# Patient Record
Sex: MALE | Race: WHITE | NOT HISPANIC OR LATINO | Employment: STUDENT | URBAN - METROPOLITAN AREA
[De-identification: names, ages, dates, MRNs, and addresses within clinical notes are randomized per-mention and may not be internally consistent; named-entity substitution may affect disease eponyms.]

---
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
## 2022-03-21 ENCOUNTER — OFFICE VISIT (OUTPATIENT)
Dept: URGENT CARE | Facility: MEDICAL CENTER | Age: 22
End: 2022-03-21
Payer: COMMERCIAL

## 2022-03-21 VITALS — WEIGHT: 180 LBS | BODY MASS INDEX: 26.66 KG/M2 | RESPIRATION RATE: 16 BRPM | TEMPERATURE: 98.1 F | HEIGHT: 69 IN

## 2022-03-21 DIAGNOSIS — L01.00 IMPETIGO: Primary | ICD-10-CM

## 2022-03-21 PROCEDURE — G0382 LEV 3 HOSP TYPE B ED VISIT: HCPCS | Performed by: FAMILY MEDICINE

## 2022-03-21 RX ORDER — CEPHALEXIN 500 MG/1
500 CAPSULE ORAL EVERY 12 HOURS SCHEDULED
Qty: 14 CAPSULE | Refills: 0 | Status: SHIPPED | OUTPATIENT
Start: 2022-03-21 | End: 2022-03-28

## 2022-03-21 NOTE — PATIENT INSTRUCTIONS
Patient is afebrile  I prescribed cephalexin 500 mg twice a day for 7 days  May continue to apply triamcinolone cream as needed for itching at least twice a day  I advised patient to keep rash occluded for now  If rash persists does not improve after 7 days, he was advised to consult with dermatologist   He was given outpatient dermatology referral     Impetigo   WHAT YOU NEED TO KNOW:   Impetigo is a skin infection caused by bacteria  The infection can cause sores to form anywhere on your body  The sores develop watery or pus-filled blisters that break and form thick crusts  Impetigo is most common in children and spreads easily from person to person  DISCHARGE INSTRUCTIONS:   Return to the emergency department if:   · You have painful, red, warm skin around the blisters  · Your face is swollen  · You urinate less than usual or there is blood in your urine  Contact your healthcare provider if:   · You have a fever  · The sores become more red, swollen, warm, or tender  · The sores do not start to heal after 3 days of treatment  · You have questions or concerns about your condition or care  Medicines:   · Antibiotics  treat the bacterial infection  Antibiotics may be given as a pill or cream  Wash your skin and gently remove any crusts before you apply the antibiotic cream      · Take your medicine as directed  Contact your healthcare provider if you think your medicine is not helping or if you have side effects  Tell him or her if you are allergic to any medicine  Keep a list of the medicines, vitamins, and herbs you take  Include the amounts, and when and why you take them  Bring the list or the pill bottles to follow-up visits  Carry your medicine list with you in case of an emergency  Prevent the spread of impetigo:   · Avoid direct contact  You can spread impetigo if someone touches or uses something that touched your infected skin   You can also spread impetigo on your own body when you touch the area and then touch somewhere else  Keep the sores covered with gauze so you will not scratch or touch them  Keep your fingernails short  Your child may need to wear mittens so he does not scratch his sores  · Wash your hands often  Always wash your hands after you touch the infected area  Wash your hands before you touch food, your eyes, or other people  If no water is available, use an alcohol-based gel to clean your hands  · Wash household items  Do not share or reuse items that have come in contact with impetigo sores  Examples include bedding, towels, washcloths, and eating utensils  These items may be used again after they have been washed with hot water and soap  Clean your sores safely:  Wash your skin sores with antibacterial soap and water  You may need to do this 2 to 3 times each day until the sores heal  If the area is crusted, gently wash the sores with gauze or a clean washcloth to remove the crust  Pat the area dry with a clean towel  Wash your hands, the washcloth, and the towel after you clean the area around the sores  Return to work or school: You may return to work or school 48 hours after you start the antibiotic medicine  If your child has impetigo, tell his school or  center about the infection  Follow up with your doctor as directed:  Write down your questions so you remember to ask them during your visits  © [employer] 2022 Information is for End User's use only and may not be sold, redistributed or otherwise used for commercial purposes  All illustrations and images included in CareNotes® are the copyrighted property of [employer]  or [employer]  The above information is an  only  It is not intended as medical advice for individual conditions or treatments  Talk to your doctor, nurse or pharmacist before following any medical regimen to see if it is safe and effective for you

## 2022-03-21 NOTE — PROGRESS NOTES
[id number]        NAME: Dannielle Mckeon is a 25 y o  male  : 2000    MRN: [id number]  DATE: 2022  TIME: 11:45 AM    Assessment and Plan   Impetigo [L01 00]  1  Impetigo  cephalexin (KEFLEX) 500 mg capsule    Ambulatory Referral to Dermatology         Patient Instructions       Follow up with PCP in 3-5 days  Proceed to  ER if symptoms worsen  Chief Complaint     Chief Complaint   Patient presents with    Rash     painful (burning), itchy rash to right lateral thigh 6 days  visited [facility] and prescribed Triamcinalone and Prednisone (not started for fear dose is too high)  History of Present Illness       19-year-old male here today with rash located in the right thigh for the past 6 days  He began is small red rash about size of a quarter and has since increased in size and redness  Describes that is somewhat tender to touch, mildly pruritic  He was seen at [facility] approximately 2 days ago where he was prescribed triamcinolone cream and prednisone tablets 60 mg which she did not fell  He has apply the triamcinolone once a twice a day with no significant improvement  Rash has since spread  Describes firm feeling in the right upper thigh  Upon further questioning, he describes being at the beach approximately week and a half ago but does not recall walking through high grass  He has a known history of aortic area which usually requires him to take Allegra as needed  Review of Systems   Review of Systems   Skin: Positive for rash           Current Medications       Current Outpatient Medications:     fexofenadine (ALLEGRA) 60 MG tablet, Take by mouth  , Disp: , Rfl:     multivitamin (THERAGRAN) TABS, Take 1 tablet by mouth daily, Disp: , Rfl:     albuterol (ProAir HFA) 90 mcg/act inhaler, Inhale 2 puffs every 6 (six) hours as needed for wheezing (Patient not taking: Reported on 2021 ), Disp: 8 5 g, Rfl: 0    benzonatate (TESSALON) 200 MG capsule, Take 1 capsule (200 mg total) by mouth 3 (three) times a day as needed for cough (Patient not taking: Reported on 3/21/2022 ), Disp: 20 capsule, Rfl: 0    cephalexin (KEFLEX) 500 mg capsule, Take 1 capsule (500 mg total) by mouth every 12 (twelve) hours for 7 days, Disp: 14 capsule, Rfl: 0    fluticasone (FLONASE) 50 mcg/act nasal spray, 2 sprays into each nostril daily (Patient not taking: Reported on 3/21/2022 ), Disp: 16 g, Rfl: 0    Current Allergies     Allergies as of 03/21/2022 - Reviewed 03/21/2022   Allergen Reaction Noted    Clindamycin Hives 05/26/2021    Amoxicillin GI Intolerance 04/26/2014    Doxycycline Hives 12/18/2021            The following portions of the patient's history were reviewed and updated as appropriate: allergies, current medications, past family history, past medical history, past social history, past surgical history and problem list      Past Medical History:   Diagnosis Date    Concussion     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        Past Surgical History:   Procedure Laterality Date    CIRCUMCISION      WISDOM TOOTH EXTRACTION         Family History   Problem Relation Age of Onset    Autoimmune disease Mother     Hypertension Father          Medications have been verified  Objective   Temp 98 1 °F (36 7 °C)   Resp 16   Ht 5' 9" (1 753 m)   Wt 81 6 kg (180 lb)   BMI 26 58 kg/m²   No LMP for male patient  Physical Exam     Physical Exam  Vitals and nursing note reviewed  Constitutional:       Appearance: Normal appearance  Skin:     Comments: Right thigh reveals erythematous, slightly elevated rash largest area measuring approximately 4 in in diameter  Firm to palpation  There is peripheral rash which seems erythematous vesicular lesion some streaking  Findings are consistent with impetigo   Neurological:      Mental Status: He is alert

## 2022-03-24 ENCOUNTER — AMB VIDEO VISIT (OUTPATIENT)
Dept: OTHER | Facility: HOSPITAL | Age: 22
End: 2022-03-24

## 2022-03-24 DIAGNOSIS — L03.115 CELLULITIS OF RIGHT LOWER EXTREMITY: Primary | ICD-10-CM

## 2022-03-24 PROCEDURE — ECARE PR SL URGENT CARE VIRTUAL VISIT: Performed by: NURSE PRACTITIONER

## 2022-03-24 RX ORDER — SULFAMETHOXAZOLE AND TRIMETHOPRIM 800; 160 MG/1; MG/1
1 TABLET ORAL EVERY 12 HOURS SCHEDULED
Qty: 14 TABLET | Refills: 0 | Status: SHIPPED | OUTPATIENT
Start: 2022-03-24 | End: 2022-03-31

## 2022-03-24 NOTE — PROGRESS NOTES
Video Visit - Suzanna Cordova 25 y o  male MRN: [id number]    REQUIRED DOCUMENTATION:         1  [facility]  2  Provider located at [address] [place] [phone]  3  [facility] provider: PEPPER Ramírez  4  Identify all parties in room with patient during [facility] visit:  Patient   5  After connecting through [employer], patient was identified by name and date of birth  Patient was then informed that this was a Telemedicine visit and that the exam was being conducted confidentially over secure lines  My office door was closed  No one else was in the room  Patient acknowledged consent and understanding of privacy and security of the Telemedicine visit  I informed the patient that I have reviewed their record in Epic and presented the opportunity for them to ask any questions regarding the visit today  The patient agreed to participate  This is a 25year old male here today for video visit  He states that on 03/18/2022 he was started on steriod on [facility] for a dermatitis  He did not start this but started steroid cream that did help  He states over the next 3 days he had no improvement  Area did start out as itchy but now is only slightly itichy  He states since starting Keflex he has not had much improvement  He denies any fevers, body aches or chills  She was diagnosed with impetigo on on 03/21  He states the area has not grown in size but has become more red  He states it started as a small [place] and then spread  He states the area is slightly warm to touch  He does note he was a at beach prior to onset of symptoms but does not recall any exposures  Review of Systems   Constitutional: Negative for activity change, chills, fatigue and unexpected weight change  Respiratory: Negative  Cardiovascular: Negative  Skin: Positive for rash and wound  Neurological: Negative  Psychiatric/Behavioral: Negative  Physical Exam  HENT:      Head: Normocephalic and atraumatic  Eyes:      Conjunctiva/sclera: Conjunctivae normal    Pulmonary:      Effort: Pulmonary effort is normal  No respiratory distress  Comments: No cough  Skin:     Comments: Right outter thigh: are of erythema that appears to be indurated  No vesicular lesions  There are 2 area that are linear and raised which would be more consistent with a dermatitis but the rest is circular  This is not streaking in nature  Area is warm as per patient  Rash is blanchable when patient applies pressure to rash  Neurological:      General: No focal deficit present  Mental Status: He is oriented to person, place, and time  Psychiatric:         Mood and Affect: Mood normal          Behavior: Behavior normal          Thought Content: Thought content normal          Judgment: Judgment normal        Diagnoses and all orders for this visit:    Cellulitis of right lower extremity  -     sulfamethoxazole-trimethoprim (BACTRIM DS) 800-160 mg per tablet; Take 1 tablet by mouth every 12 (twelve) hours for 7 days  -     mupirocin (BACTROBAN) 2 % ointment; Apply topically 3 (three) times a day      Patient Instructions   Patient hesitant to try oral steroid  Will start bactrim and continue Kelfex to see if there is improvement  Will start Bactroban cream   Patient was given strict instructions to monitor and should follow up in 2-3 days if no improvement  Go to ER with any worsening symptoms, increased redness, fevers streaking or swelling  Follow up with PCP if not improved, if symptoms are worse, go to the ER

## 2022-03-25 ENCOUNTER — HOSPITAL ENCOUNTER (EMERGENCY)
Facility: HOSPITAL | Age: 22
Discharge: HOME | End: 2022-03-25
Attending: EMERGENCY MEDICINE | Admitting: EMERGENCY MEDICINE
Payer: COMMERCIAL

## 2022-03-25 VITALS
SYSTOLIC BLOOD PRESSURE: 128 MMHG | TEMPERATURE: 99.7 F | OXYGEN SATURATION: 99 % | HEART RATE: 111 BPM | DIASTOLIC BLOOD PRESSURE: 65 MMHG | RESPIRATION RATE: 20 BRPM

## 2022-03-25 DIAGNOSIS — R21 RASH: Primary | ICD-10-CM

## 2022-03-25 PROCEDURE — 99283 EMERGENCY DEPT VISIT LOW MDM: CPT

## 2022-03-25 PROCEDURE — 99284 EMERGENCY DEPT VISIT MOD MDM: CPT | Performed by: EMERGENCY MEDICINE

## 2022-03-25 RX ORDER — PREDNISONE 20 MG/1
40 TABLET ORAL DAILY
Qty: 8 TABLET | Refills: 0 | Status: SHIPPED | OUTPATIENT
Start: 2022-03-26 | End: 2022-03-30

## 2022-03-25 RX ORDER — PREDNISONE 20 MG/1
40 TABLET ORAL DAILY
Qty: 8 TABLET | Refills: 0 | Status: SHIPPED | OUTPATIENT
Start: 2022-03-26 | End: 2022-03-25 | Stop reason: CLARIF

## 2022-03-25 RX ORDER — DIPHENHYDRAMINE HCL 25 MG
50 TABLET ORAL ONCE
Status: COMPLETED | OUTPATIENT
Start: 2022-03-25 | End: 2022-03-25

## 2022-03-25 RX ORDER — PREDNISONE 20 MG/1
40 TABLET ORAL ONCE
Status: COMPLETED | OUTPATIENT
Start: 2022-03-25 | End: 2022-03-25

## 2022-03-25 RX ADMIN — DIPHENHYDRAMINE HCL 50 MG: 25 TABLET ORAL at 15:16

## 2022-03-25 RX ADMIN — PREDNISONE 40 MG: 20 TABLET ORAL at 15:17

## 2022-03-25 NOTE — ED ATTENDING ATTESTATION
3/25/2022  ILisa MD, saw and evaluated the patient  I have discussed the patient with the resident/non-physician practitioner and agree with the resident's/non-physician practitioner's findings, Plan of Care, and MDM as documented in the resident's/non-physician practitioner's note, except where noted  All available labs and Radiology studies were reviewed  I was present for key portions of any procedure(s) performed by the resident/non-physician practitioner and I was immediately available to provide assistance  At this point I agree with the current assessment done in the Emergency Department    I have conducted an independent evaluation of this patient a history and physical is as follows:  Patient presents for evaluation of a rash approximately 8 or 9 days ago he was at the beach he was not in the sun he thought his though he may have been bitten by something on his right lateral thigh thought there was a small bump and he developed a rash initially given steroid cream then seen once again started on Keflex then had a video visit yesterday and was started on Bactrim since he started taking the Bactrim he has developed redness of his legs below the knees and in his left thigh the 2 rashes are not similar  Mildly pruritic   No respiratory symptoms no mucous membrane involvement no swelling of the hands or the joints no abdominal pain  Exam the patient is in no acute distress throat is clear mucous membranes are moist there is no lesions in the mouth lungs are clear heart is regular abdomen soft nontender the patient has appears to be a drug rash on his lower extremities it blanches no petechiae are noted there is also compliant patch of raised red rash that blanches on his right lateral thigh it is not  warm or indurated  From a clinical standpoint this appears to be an allergic dermatitis on his right thigh however his other rash appears to be related to the Bactrim  I have advised the patient to stop all antibiotics I advised the patient not to use Neosporin on the area  I will place the patient on a short course of steroids and Benadryl  ED Course         Critical Care Time  Procedures

## 2022-03-25 NOTE — DISCHARGE INSTRUCTIONS
Follow-up with PCP for further care  You can also follow up with dermatology if needed  Contact info provided below if needed  Stop taking prescribed antibiotics  Use Benadryl as stated on the bottle  Take prescribed medications as prescribed and to completion  Return to the ED with new or worsening symptoms

## 2022-03-25 NOTE — PATIENT INSTRUCTIONS
Patient hesitant to try oral steroid  Will start bactrim and continue Kelfex to see if there is improvement  Will start Bactroban cream   Patient was given strict instructions to monitor and should follow up in 2-3 days if no improvement  Go to ER with any worsening symptoms, increased redness, fevers streaking or swelling

## 2022-03-25 NOTE — ED PROVIDER NOTES
History  Chief Complaint   Patient presents with    Allergic Reaction     started Sulfa-Methoxazole  PM and  AM; noticed bright red rash on legs and now throughout body; no SOB, N/V/D; yes headache    Rash     Pt is a 19yo M who presents for rash  Patient reports that approximately week ago he noticed a red area on the lateral aspect of his right thigh  Patient reports that it was multiple reddened areas that progressed and formed a confluence  Patient states he was started on Keflex and did not appear to be having any improvement  Patient states he was seen again yesterday and started on Bactrim in addition to his Keflex  Patient states when he woke up this morning someone made a comment about the redness of his legs  Patient states it was at this time that he noticed a diffuse erythematous rash on extremities and trunk  Patient states the initial area of rash is minimally painful and nonpruritic  Patient denies any pruritus or pain in the diffuse rash  Patient states that he has previously had allergic reactions to other antibiotics  Patient denies any difficulty breathing or swallowing  Patient able to swallow secretions and denies any intraoral swelling  Patient denies any changes to soaps, laundry detergent, or cosmetic products  Patient states he is otherwise healthy  Prior to Admission Medications   Prescriptions Last Dose Informant Patient Reported? Taking?    albuterol (ProAir HFA) 90 mcg/act inhaler   No No   Sig: Inhale 2 puffs every 6 (six) hours as needed for wheezing   Patient not taking: Reported on 2021    cephalexin (KEFLEX) 500 mg capsule   No No   Sig: Take 1 capsule (500 mg total) by mouth every 12 (twelve) hours for 7 days   fexofenadine (ALLEGRA) 60 MG tablet  Self Yes No   Sig: Take by mouth     fluticasone (FLONASE) 50 mcg/act nasal spray   No No   Si sprays into each nostril daily   Patient not taking: Reported on 3/21/2022    multivitamin [facility]) TABS  Self Yes No   Sig: Take 1 tablet by mouth daily   mupirocin (BACTROBAN) 2 % ointment   No No   Sig: Apply topically 3 (three) times a day   sulfamethoxazole-trimethoprim (BACTRIM DS) 800-160 mg per tablet   No No   Sig: Take 1 tablet by mouth every 12 (twelve) hours for 7 days      Facility-Administered Medications: None       Past Medical History:   Diagnosis Date    Concussion     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        Past Surgical History:   Procedure Laterality Date    CIRCUMCISION      WISDOM TOOTH EXTRACTION         Family History   Problem Relation Age of Onset    Autoimmune disease Mother     Hypertension Father      I have reviewed and agree with the history as documented  E-Cigarette/Vaping    E-Cigarette Use Never User      E-Cigarette/Vaping Substances    Nicotine No     THC No     CBD No     Flavoring No     Other No     Unknown No      Social History     Tobacco Use    Smoking status: Never Smoker    Smokeless tobacco: Never Used   Vaping Use    Vaping Use: Never used   Substance Use Topics    Alcohol use: Yes    Drug use: No        Review of Systems   Skin: Positive for rash  Neurological: Positive for headaches  All other systems reviewed and are negative  Physical Exam  ED Triage Vitals [03/25/22 1418]   Temperature Pulse Respirations Blood Pressure SpO2   99 7 °F (37 6 °C) (!) 111 20 128/65 99 %      Temp Source Heart Rate Source Patient Position - Orthostatic VS BP Location FiO2 (%)   Oral Monitor -- -- --      Pain Score       --             Orthostatic Vital Signs  Vitals:    03/25/22 1418   BP: 128/65   Pulse: (!) 111       Physical Exam  Vitals reviewed  Constitutional:       General: He is not in acute distress  Appearance: He is well-developed  He is not toxic-appearing or diaphoretic  HENT:      Head: Normocephalic and atraumatic        Right Ear: External ear normal       Left Ear: External ear normal       Nose: Nose normal       Mouth/Throat:      Mouth: Mucous membranes are moist       Pharynx: Oropharynx is clear  Comments: No intraoral lesions or swelling noted  Eyes:      Extraocular Movements: Extraocular movements intact  Conjunctiva/sclera: Conjunctivae normal       Pupils: Pupils are equal, round, and reactive to light  Cardiovascular:      Rate and Rhythm: Regular rhythm  Tachycardia present  Heart sounds: Normal heart sounds  No murmur heard  Pulmonary:      Effort: Pulmonary effort is normal  No respiratory distress  Breath sounds: Normal breath sounds  No stridor  No wheezing  Abdominal:      General: There is no distension  Palpations: Abdomen is soft  Tenderness: There is no abdominal tenderness  Musculoskeletal:         General: Normal range of motion  Cervical back: Normal range of motion and neck supple  Right lower leg: No edema  Left lower leg: No edema  Skin:     General: Skin is warm and dry  Capillary Refill: Capillary refill takes less than 2 seconds  Comments: Slightly raised and irregular erythematous rash on lateral aspect of right thigh; blanching; nonpainful to palpation  Diffuse, flat erythema present on extremities and trunk; blanching; nonpainful to palpation  All rashes Nikolsky negative   Neurological:      General: No focal deficit present  Mental Status: He is alert and oriented to person, place, and time  Psychiatric:         Speech: Speech normal          Behavior: Behavior is cooperative  ED Medications  Medications   diphenhydrAMINE (BENADRYL) tablet 50 mg (50 mg Oral Given 3/25/22 1516)   predniSONE tablet 40 mg (40 mg Oral Given 3/25/22 1517)       Diagnostic Studies  Results Reviewed     None                 No orders to display         Procedures  Procedures      ED Course         CRAFFT      Most Recent Value   SBIRT (13-21 yo)    In order to provide better care to our patients, we are screening all of our patients for alcohol and drug use   Would it be okay to ask you these screening questions? Yes Filed at: 03/25/2022 1443   RACHAEL Initial Screen: During the past 12 months, did you:    1  Drink any alcohol (more than a few sips)? No Filed at: 03/25/2022 1443   2  Smoke any marijuana or hashish No Filed at: 03/25/2022 1443   3  Use anything else to get high? ("anything else" includes illegal drugs, over the counter and prescription drugs, and things that you sniff or 'gregg')? No Filed at: 03/25/2022 1443                                    MDM  Number of Diagnoses or Management Options  Rash  Diagnosis management comments: Pt is a 19yo M who presents with rash  Exam pertinent for distinct rash on right lateral thigh as well as diffuse erythematous rash  Differential diagnosis to include but not limited to medication reaction, cellulitis, SJS, urticaria  Will treat as allergic reaction with Benadryl and prednisone  Patient's initial rash does not appear to be cellulitic and therefore will recommend patient stop antibiotics  No evidence of skin sloughing and therefore unlikely to be yesterday SJS  Discussed plan with patient who is in agreement  Will also give patient contact information for Dermatology for follow-up  Plan to discharge pt with f/u to dermatology  Discussed returning the ED with significant worsening of symptoms or skin sloughing  Discussed use of over the counter medications as stated on the bottle as needed for pain  Discussed taking new medication as prescribed and to completion  Discussed discontinuing all antibiotics previously prescribed  Pt expressed understanding of discharge instructions, return precautions, and medication instructions  All questions were answered and pt was discharged without incident             Disposition  Final diagnoses:   Rash     Time reflects when diagnosis was documented in both MDM as applicable and the Disposition within this note     Time User Action Codes Description Comment    3/25/2022  3:28 PM Sherry Daey Add [R21] Rash       ED Disposition     ED Disposition Condition Date/Time Comment    Discharge Stable Fri Mar 25, 2022  3:28 PM Randy Bill discharge to home/self care  Follow-up Information     Follow up With Specialties Details Why Contact Info Additional [address], DO Family Medicine Call  As needed [address] [facility] [phone] [facility]  [phone]       ADRIANE CLEMENT Dermatology Call  As needed [address] [id number] [phone] [facility] GRACE MANCUSO, [facility] [place] [address] [place] [address], [address] [place] [address]          Discharge Medication List as of 3/25/2022  3:39 PM      START taking these medications    Details   predniSONE 20 mg tablet Take 2 tablets (40 mg total) by mouth daily for 4 days, Starting Sat 3/26/2022, Until Wed 3/30/2022, Normal         CONTINUE these medications which have NOT CHANGED    Details   albuterol (ProAir HFA) 90 mcg/act inhaler Inhale 2 puffs every 6 (six) hours as needed for wheezing, Starting Mon 10/11/2021, Normal      cephalexin (KEFLEX) 500 mg capsule Take 1 capsule (500 mg total) by mouth every 12 (twelve) hours for 7 days, Starting Mon 3/21/2022, Until Mon 3/28/2022, Normal      fexofenadine (ALLEGRA) 60 MG tablet Take by mouth  , Historical Med      fluticasone (FLONASE) 50 mcg/act nasal spray 2 sprays into each nostril daily, Starting Sat 12/18/2021, Normal      multivitamin (THERAGRAN) TABS Take 1 tablet by mouth daily, Historical Med      mupirocin (BACTROBAN) 2 % ointment Apply topically 3 (three) times a day, Starting Thu 3/24/2022, Normal      sulfamethoxazole-trimethoprim (BACTRIM DS) 800-160 mg per tablet Take 1 tablet by mouth every 12 (twelve) hours for 7 days, Starting Thu 3/24/2022, Until Thu 3/31/2022, Normal           No discharge procedures on file      PDMP Review     None           ED Provider  Attending physically available and evaluated Ezekiel Sebastian I managed the patient along with the ED Attending      Electronically Signed by         Johnson Patel MD  04/01/22 [id number]

## 2022-03-25 NOTE — CARE ANYWHERE EVISITS
Visit Summary for Christy Dietrich - Gender: Male - Date of Birth: [birth year]  Date: [id number] - Duration: 12 minutes  Patient: Christy Dietrich  Provider: Joseph PABON    Patient Contact Information  Address  [address] [place] [address]; [address] [facility]  [phone]    Visit Topics  Rash [Added By: Self - 2022-03-24]    Triage Questions   What is your current physical address in the event of a medical emergency? Answer []  Are you allergic to any medications? Answer []  Are you now or could you be pregnant? Answer []  Do you have any immune system compromise or chronic lung   disease? Answer []  Do you have any vulnerable family members in the home (infant, pregnant, cancer, elderly)? Answer []     Conversation Transcripts  [0A][0A] [Notification] You are connected with Yovanny Lim, Urgent Care [facility] is located in [place]  [0A][Notification] Christy Dietrich has shared health history  Lou Rosesoha  [0A]    Diagnosis  Cellulitis of right lower limb    Procedures  Value: [id number] Code: CPT-4 UNLISTED E&M SERVICE    Medications Prescribed    No prescriptions ordered    Electronically signed by: Yovanny Whitley(NPI [id number])

## 2022-03-31 ENCOUNTER — TELEPHONE (OUTPATIENT)
Dept: DERMATOLOGY | Facility: CLINIC | Age: 22
End: 2022-03-31

## 2023-01-26 ENCOUNTER — TELEPHONE (OUTPATIENT)
Dept: FAMILY MEDICINE CLINIC | Facility: CLINIC | Age: 23
End: 2023-01-26

## 2023-01-26 NOTE — TELEPHONE ENCOUNTER
1st call    [facility] for patient to call back to schedule annual physical   Last OV 7/21/21  Requested call back to let us know if he has established care elsewhere

## 2023-06-28 ENCOUNTER — TELEPHONE (OUTPATIENT)
Dept: UROLOGY | Facility: AMBULATORY SURGERY CENTER | Age: 23
End: 2023-06-28

## 2023-06-28 NOTE — TELEPHONE ENCOUNTER
New Patient    What is the reason for the patient’s appointment?: Pt states that he has had a dull have numb pain in the left side of his testicle for about about a week  He states he doesn't think is it is swollen  No problems urinating       What office location does the patient prefer?: medical Center     Does patient have Imaging/Lab Results: no    Have patient records been requested?: no   If No, are the records showing in Epic: yes       INSURANCE:   Do we accept the patient's insurance or is the patient Self-Pay?: yes    Insurance Provider: Frosty Dew  Plan Type/Number:   Member ID#:       HISTORY:   Has the patient had any previous Urologist(s)?: no    Was the patient seen in the ED?: no    Has the patient had any outside testing done?: no    Does the patient have a personal history of cancer?: no     Pt can be reached [phone]

## 2023-06-29 NOTE — TELEPHONE ENCOUNTER
Patient returning missed call from triage nurse Everett Thakur      Transferred call to Everett Thakur

## 2023-06-29 NOTE — TELEPHONE ENCOUNTER
Spoke with patient, reports having testicle pain and pain at base of his penis for a few weeks now  No other symptoms  Denies fever, chills, nausea or vomiting  Denies any trouble with urination  Appt scheduled for 07/14/23  ER precautions reviewed

## 2023-07-14 ENCOUNTER — OFFICE VISIT (OUTPATIENT)
Dept: UROLOGY | Facility: CLINIC | Age: 23
End: 2023-07-14
Payer: COMMERCIAL

## 2023-07-14 VITALS
WEIGHT: 144 LBS | BODY MASS INDEX: 21.82 KG/M2 | SYSTOLIC BLOOD PRESSURE: 120 MMHG | DIASTOLIC BLOOD PRESSURE: 80 MMHG | HEIGHT: 68 IN | OXYGEN SATURATION: 98 % | HEART RATE: 88 BPM

## 2023-07-14 DIAGNOSIS — N50.812 TESTICULAR PAIN, LEFT: Primary | ICD-10-CM

## 2023-07-14 PROCEDURE — 99203 OFFICE O/P NEW LOW 30 MIN: CPT | Performed by: PHYSICIAN ASSISTANT

## 2023-07-14 NOTE — PROGRESS NOTES
7/14/2023      Chief Complaint   Patient presents with   • New Patient Visit     Patient in our office due to Left side testicular pain/ noticed pain 2 weeks ago         Assessment and Plan    23 y.o. male managed by NEW PATIENT    1. Left orchalgia  2. Remote penile sexual injury      His orchalgia is improving spontaneously. I suggested nsaid prn discomfort and stretching before/after exercise, good scrotal support. Check testicular US evaluate his anatomy and cords. Suspicion for mass or torsion is exceedingly low. For his remote penile sexual injury with a bend during intercourse he has good rigid erections without curvature. Some mild pain which has improved over the year. Perhaps an incomplete corporal injury with acute pain phase resolving. History of Present Illness  Annabel Antony is a 23 y.o. male here for evaluation of dull pain left testicle 2 weeks or so some pain at base of penis with erections for the past year after a minor sexual injury with female partner on top penis bent no pain no pop no swelling or bruising. Has had normal erections since but some discomfort in that one spot. No voiding issue. No nausea/vomiting. No hematuria or dysuria. Nothing makes it better or worse. No prior  surgeries. Review of Systems   Constitutional: Negative. Respiratory: Negative. Cardiovascular: Negative. Genitourinary: Negative for decreased urine volume, difficulty urinating, dysuria, flank pain, frequency, hematuria and urgency. Musculoskeletal: Negative.             AUA SYMPTOM SCORE    Flowsheet Row Most Recent Value   AUA SYMPTOM SCORE    How often have you had a sensation of not emptying your bladder completely after you finished urinating? 2 (P)     How often have you had to urinate again less than two hours after you finished urinating? 4 (P)     How often have you found you stopped and started again several times when you urinate? 1 (P)     How often have you found it difficult to postpone urination? 2 (P)     How often have you had a weak urinary stream? 0 (P)     How often have you had to push or strain to begin urination? 3 (P)     How many times did you most typically get up to urinate from the time you went to bed at night until the time you got up in the morning? 1 (P)     Quality of Life: If you were to spend the rest of your life with your urinary condition just the way it is now, how would you feel about that? 2 (P)     AUA SYMPTOM SCORE 13 (P)            Vitals  Vitals:    07/14/23 1040   BP: 120/80   BP Location: Left arm   Patient Position: Sitting   Cuff Size: Standard   Pulse: 88   SpO2: 98%   Weight: 65.3 kg (144 lb)   Height: 5' 8" (1.727 m)       Physical Exam  Vitals and nursing note reviewed. Constitutional:       General: He is not in acute distress. Appearance: He is well-developed. He is not diaphoretic. HENT:      Head: Normocephalic and atraumatic. Pulmonary:      Effort: Pulmonary effort is normal.   Genitourinary:     Comments: Circumcised penis, normal phallus, orthotopic patent meatus. No penile plaque or curvature or tenderness. Testes smooth descended bilaterally into the scrotum nontender with no palpable mass. Skin:     General: Skin is warm and dry. Neurological:      Mental Status: He is alert and oriented to person, place, and time. Gait: Gait normal.   Psychiatric:         Speech: Speech normal.         Behavior: Behavior normal.           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      Social History     Socioeconomic History   • Marital status: Single     Spouse name: Not on file   • Number of children: Not on file   • Years of education: Not on file   • Highest education level: Not on file   Occupational History   • Not on file   Tobacco Use   • Smoking status: Never   • Smokeless tobacco: Never   Vaping Use   • Vaping Use: Never used   Substance and Sexual Activity   • Alcohol use:  Yes • Drug use: No   • Sexual activity: Not Currently   Other Topics Concern   • Not on file   Social History Narrative   • Not on file     Social Determinants of Health     Financial Resource Strain: Not on file   Food Insecurity: Not on file   Transportation Needs: Not on file   Physical Activity: Not on file   Stress: Not on file   Social Connections: Not on file   Intimate Partner Violence: Not on file   Housing Stability: Not on file     Social History     Tobacco Use   Smoking Status Never   Smokeless Tobacco Never     Family History   Problem Relation Age of Onset   • Autoimmune disease Mother    • Hypertension Father        The following portions of the patient's history were reviewed and updated as appropriate: allergies, current medications, past medical history, past social history, past surgical history and problem list.    Results  No results found for this or any previous visit (from the past 1 hour(s)). ]  No results found for: "PSA"  Lab Results   Component Value Date    CALCIUM 10.0 07/21/2021    K 4.5 07/21/2021    CO2 31 07/21/2021     07/21/2021    BUN 12 07/21/2021    CREATININE 0.82 07/21/2021     Lab Results   Component Value Date    WBC 5.44 07/21/2021    HGB 15.4 07/21/2021    HCT 43.5 07/21/2021    MCV 89 07/21/2021     07/21/2021

## 2023-07-21 ENCOUNTER — OFFICE VISIT (OUTPATIENT)
Dept: FAMILY MEDICINE CLINIC | Facility: CLINIC | Age: 23
End: 2023-07-21
Payer: COMMERCIAL

## 2023-07-21 VITALS
RESPIRATION RATE: 16 BRPM | OXYGEN SATURATION: 98 % | DIASTOLIC BLOOD PRESSURE: 70 MMHG | BODY MASS INDEX: 28.06 KG/M2 | HEART RATE: 65 BPM | TEMPERATURE: 97.6 F | SYSTOLIC BLOOD PRESSURE: 126 MMHG | WEIGHT: 196 LBS | HEIGHT: 70 IN

## 2023-07-21 DIAGNOSIS — R00.2 PALPITATIONS: ICD-10-CM

## 2023-07-21 DIAGNOSIS — R35.0 URINARY FREQUENCY: ICD-10-CM

## 2023-07-21 DIAGNOSIS — R79.89 ELEVATED LIVER FUNCTION TESTS: Primary | ICD-10-CM

## 2023-07-21 DIAGNOSIS — R10.10 PAIN OF UPPER ABDOMEN: ICD-10-CM

## 2023-07-21 LAB
SL AMB  POCT GLUCOSE, UA: NORMAL
SL AMB LEUKOCYTE ESTERASE,UA: NORMAL
SL AMB POCT BILIRUBIN,UA: NORMAL
SL AMB POCT BLOOD,UA: NORMAL
SL AMB POCT CLARITY,UA: CLEAR
SL AMB POCT COLOR,UA: YELLOW
SL AMB POCT KETONES,UA: NORMAL
SL AMB POCT NITRITE,UA: NORMAL
SL AMB POCT PH,UA: 6.5
SL AMB POCT SPECIFIC GRAVITY,UA: 1.01
SL AMB POCT URINE PROTEIN: NORMAL
SL AMB POCT UROBILINOGEN: 0.2

## 2023-07-21 PROCEDURE — 99214 OFFICE O/P EST MOD 30 MIN: CPT | Performed by: FAMILY MEDICINE

## 2023-07-21 PROCEDURE — 81003 URINALYSIS AUTO W/O SCOPE: CPT | Performed by: FAMILY MEDICINE

## 2023-07-21 NOTE — PROGRESS NOTES
Assessment/Plan:     1. Elevated liver function tests  Assessment & Plan:  Check labs and f/u with results; repeat u/s     Orders:  -     Comprehensive metabolic panel; Future  -     Lipid Panel with Direct LDL reflex; Future    2. Pain of upper abdomen  Assessment & Plan:  Check labs and repeat u/s; f/u with results; f/u guidance given    Orders:  -     US abdomen complete; Future; Expected date: 07/21/2023  -     Comprehensive metabolic panel; Future  -     Lipase; Future    3. Palpitations  Assessment & Plan:  EKG WNL; check labs and Holter; f/u guidance given    Orders:  -     CBC and differential; Future  -     TSH, 3rd generation; Future  -     POCT ECG  -     Holter monitor; Future; Expected date: 07/21/2023    4. Urinary frequency  Assessment & Plan:  U/a unremarkable; f/u with urology as recommended    Orders:  -     POCT urine dip auto non-scope        Subjective:      Patient ID: Cristopher Tenorio is a 23 y.o. male. Patient is here with concerns of chest tightness and palpitations. Seems to have started on Monday. Feels it intermittently. Notices it more when he is trying to fall asleep or sitting. He has been exercising regularly this week and has not had any issues. Denies any stress. Denies any supplements. Does drink coffee about 2 cups a day. No lightheadedness or dizziness. NO recent illnesses. Denies any known heart disease in family. Patient states his pain never fully resolved since when he had mono. Not daily. Seems to be mostly under ribs B/L. Did have elevated liver enzymes with mono and labs had returned to normal but symptoms had persisted. Patient went to urologist last week for testicular pain.        The following portions of the patient's history were reviewed and updated as appropriate: allergies, current medications, past family history, past medical history, past social history, past surgical history, and problem list.    Review of Systems   Constitutional: Negative for chills, fatigue and fever. Respiratory: Negative for shortness of breath and wheezing. Cardiovascular: Positive for chest pain and palpitations. Gastrointestinal: Positive for abdominal pain. Negative for blood in stool, constipation, diarrhea, nausea and vomiting. Neurological: Negative for dizziness and light-headedness. Objective:      /70 (BP Location: Right arm, Patient Position: Sitting, Cuff Size: Large)   Pulse 65   Temp 97.6 °F (36.4 °C) (Temporal)   Resp 16   Ht 5' 9.75" (1.772 m)   Wt 88.9 kg (196 lb)   SpO2 98%   BMI 28.33 kg/m²          Physical Exam  Vitals reviewed. Constitutional:       General: He is not in acute distress. Appearance: Normal appearance. He is not ill-appearing, toxic-appearing or diaphoretic. HENT:      Head: Normocephalic and atraumatic. Eyes:      General: No scleral icterus. Right eye: No discharge. Left eye: No discharge. Conjunctiva/sclera: Conjunctivae normal.   Cardiovascular:      Rate and Rhythm: Normal rate and regular rhythm. Pulses: Normal pulses. Heart sounds: Normal heart sounds. No murmur heard. No gallop. Pulmonary:      Effort: Pulmonary effort is normal. No respiratory distress. Breath sounds: Normal breath sounds. No stridor. No wheezing, rhonchi or rales. Abdominal:      Palpations: Abdomen is soft. There is no mass. Tenderness: There is no guarding or rebound. Musculoskeletal:      Right lower leg: No edema. Left lower leg: No edema. Neurological:      General: No focal deficit present. Mental Status: He is alert and oriented to person, place, and time. Psychiatric:         Mood and Affect: Mood normal.         Behavior: Behavior normal.         Thought Content:  Thought content normal.         Judgment: Judgment normal.

## 2023-07-22 ENCOUNTER — HOSPITAL ENCOUNTER (OUTPATIENT)
Dept: ULTRASOUND IMAGING | Facility: HOSPITAL | Age: 23
Discharge: HOME | End: 2023-07-22
Payer: COMMERCIAL

## 2023-07-22 DIAGNOSIS — N50.812 TESTICULAR PAIN, LEFT: ICD-10-CM

## 2023-07-22 PROCEDURE — 76870 US EXAM SCROTUM: CPT

## 2023-07-23 PROBLEM — R35.0 URINARY FREQUENCY: Status: ACTIVE | Noted: 2023-07-23

## 2023-07-23 PROBLEM — R00.2 PALPITATIONS: Status: ACTIVE | Noted: 2023-07-23

## 2023-07-23 PROCEDURE — 93000 ELECTROCARDIOGRAM COMPLETE: CPT | Performed by: FAMILY MEDICINE

## 2023-08-07 ENCOUNTER — HOSPITAL ENCOUNTER (OUTPATIENT)
Dept: ULTRASOUND IMAGING | Facility: HOSPITAL | Age: 23
Discharge: HOME | End: 2023-08-07
Payer: COMMERCIAL

## 2023-08-07 ENCOUNTER — HOSPITAL ENCOUNTER (OUTPATIENT)
Dept: NON INVASIVE DIAGNOSTICS | Facility: HOSPITAL | Age: 23
Discharge: HOME | End: 2023-08-07
Payer: COMMERCIAL

## 2023-08-07 DIAGNOSIS — R00.2 PALPITATIONS: ICD-10-CM

## 2023-08-07 DIAGNOSIS — R10.10 PAIN OF UPPER ABDOMEN: ICD-10-CM

## 2023-08-07 PROCEDURE — 93226 XTRNL ECG REC<48 HR SCAN A/R: CPT

## 2023-08-07 PROCEDURE — 93225 XTRNL ECG REC<48 HRS REC: CPT

## 2023-08-07 PROCEDURE — 76700 US EXAM ABDOM COMPLETE: CPT

## 2023-08-08 ENCOUNTER — OFFICE VISIT (OUTPATIENT)
Dept: URGENT CARE | Facility: MEDICAL CENTER | Age: 23
End: 2023-08-08
Payer: COMMERCIAL

## 2023-08-08 VITALS
HEART RATE: 104 BPM | DIASTOLIC BLOOD PRESSURE: 60 MMHG | SYSTOLIC BLOOD PRESSURE: 136 MMHG | RESPIRATION RATE: 20 BRPM | BODY MASS INDEX: 29.03 KG/M2 | TEMPERATURE: 102.5 F | WEIGHT: 196 LBS | HEIGHT: 69 IN | OXYGEN SATURATION: 98 %

## 2023-08-08 DIAGNOSIS — J02.9 SORE THROAT: Primary | ICD-10-CM

## 2023-08-08 LAB — S PYO AG THROAT QL: NEGATIVE

## 2023-08-08 PROCEDURE — 87070 CULTURE OTHR SPECIMN AEROBIC: CPT

## 2023-08-08 PROCEDURE — 99212 OFFICE O/P EST SF 10 MIN: CPT

## 2023-08-08 PROCEDURE — 87147 CULTURE TYPE IMMUNOLOGIC: CPT

## 2023-08-08 NOTE — PROGRESS NOTES
[facility]        NAME: Nahomy Bhat is a 26 y.o. male  : 2000    MRN: [id number]  DATE: 2023  TIME: 8:32 PM    Assessment and Plan   Sore throat [J02.9]  1. Sore throat  POCT rapid strepA    Throat culture        Rapid strep test: Negative. Throat culture sent out. Minimal concerns for peritonsillar abscess. Advised symptomatic treatment and strict ER precautions. Patient Instructions     Your rapid strep test came back negative. A throat culture will be sent out, the results will be available in about 24-72 hours on [facility]. If the culture comes back positive, you may need treatment with antibiotics. For now, treat symptomatically as below. Fever/Body Aches: We recommend you take 600mg ibuprofen every 6 hours or tylenol 650mg every 6 hours as needed for fever. If needed, you can alternate these medications so that you take one medication every 3 hours. For instance, at noon take ibuprofen, then at 3pm take tylenol, then at 6pm take ibuprofen. Cough: Delsym, an over the counter cough medication may be used every 12 hours as needed. Mucinex XR (guafenisen) 600 mg tablets may be used to thin out the mucous to make it easier to cough up. You may take 1-2 tablets twice per day as needed. Utilizing a vaporizer/humidifier may help, as well as increasing fluids. Sore Throat: Salt water gargles with 1 teaspoon of salt dissolved in 6-8 oz of water as needed can help with a sore throat, as can honey, drinking plenty of liquids, eating soft foods. If severe, can utilize OTC chloraseptic spray. Nasal Congestion: Over the counter allergy medication like Claritin, Allegra or Zyrtec can help with nasal congestion and post nasal drip. Over the counter saline or steroid nasal sprays like Flonase can help with nasal congestion and post nasal drip as well. Over the counter decongestants such as Sudafed may also help. Upset Stomach: Drink plenty of fluids.  May utilize Gatorade, Pedialyte, or other electrolyte solutions to supplement. Eat bland foods (ex: BRAT - bananas, rice, applesauce, toast) and slowly advance to other foods as tolerated. Follow up with PCP in 3-5 days. Proceed to  ER if symptoms worsen. Chief Complaint     Chief Complaint   Patient presents with   • Sore Throat     Patient states he started last night with a lump in his  throat, tod ay he has had body aches, cough, headache. He took flu medication. And tylenol an hour ago. History of Present Illness       Sore Throat   This is a new problem. The current episode started yesterday. The problem has been gradually worsening. The maximum temperature recorded prior to his arrival was 102 - 102.9 F. Pain severity now: more so a lump in the throat sensation rather than sore throat. Associated symptoms include coughing (slight, more clearing congestion in chest), headaches and shortness of breath (describes feeling of "blocked breathing"). Pertinent negatives include no abdominal pain, congestion, diarrhea, drooling, ear pain, stridor, trouble swallowing or vomiting. He has had no exposure to strep or mono. Treatments tried: Advil, cold and flu medication. The treatment provided mild relief. Home COVID test negative. Review of Systems   Review of Systems   Constitutional: Positive for appetite change (decreased), chills and fever. HENT: Positive for sore throat (more so discomfort) and voice change (hoarseness; not muffled). Negative for congestion, drooling, ear pain, facial swelling, rhinorrhea and trouble swallowing. Respiratory: Positive for cough (slight, more clearing congestion in chest), chest tightness (slight earlier, "not really noticeable", since resolved), shortness of breath (describes feeling of "blocked breathing") and wheezing. Negative for stridor. Gastrointestinal: Negative for abdominal pain, diarrhea, nausea and vomiting. Musculoskeletal: Positive for myalgias.    Skin: Negative for rash. Neurological: Positive for headaches. Current Medications       Current Outpatient Medications:   •  multivitamin (THERAGRAN) TABS, Take 1 tablet by mouth daily, Disp: , Rfl:     Current Allergies     Allergies as of 08/08/2023 - Reviewed 08/08/2023   Allergen Reaction Noted   • Clindamycin Hives 05/26/2021   • Amoxicillin GI Intolerance 04/26/2014   • Doxycycline Hives 12/18/2021            The following portions of the patient's history were reviewed and updated as appropriate: allergies, current medications, past family history, past medical history, past social history, past surgical history and problem list.     Past Medical History:   Diagnosis Date   • Concussion    • 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        Past Surgical History:   Procedure Laterality Date   • CIRCUMCISION     • WISDOM TOOTH EXTRACTION         Family History   Problem Relation Age of Onset   • Autoimmune disease Mother    • Hypertension Father          Medications have been verified. Objective   /60   Pulse 104   Temp (!) 102.5 °F (39.2 °C)   Resp 20   Ht 5' 9" (1.753 m)   Wt 88.9 kg (196 lb)   SpO2 98%   BMI 28.94 kg/m²        Physical Exam     Physical Exam  Vitals and nursing note reviewed. Constitutional:       General: He is not in acute distress. Appearance: Normal appearance. He is not ill-appearing. HENT:      Nose: Nose normal.      Mouth/Throat:      Mouth: Mucous membranes are moist.      Pharynx: Posterior oropharyngeal erythema present. No oropharyngeal exudate. Tonsils: No tonsillar exudate. 0 on the right. 0 on the left. Cardiovascular:      Rate and Rhythm: Normal rate and regular rhythm. Pulses: Normal pulses. Heart sounds: Normal heart sounds. Pulmonary:      Effort: Pulmonary effort is normal. No respiratory distress. Breath sounds: Normal breath sounds. No stridor. No wheezing, rhonchi or rales. Musculoskeletal:      Cervical back: Neck supple. No tenderness. Lymphadenopathy:      Cervical: No cervical adenopathy. Neurological:      Mental Status: He is alert.

## 2023-08-09 NOTE — PATIENT INSTRUCTIONS
Your rapid strep test came back negative. A throat culture will be sent out, the results will be available in about 24-72 hours on [facility]. If the culture comes back positive, you may need treatment with antibiotics. For now, treat symptomatically as below. Fever/Body Aches: We recommend you take 600mg ibuprofen every 6 hours or tylenol 650mg every 6 hours as needed for fever. If needed, you can alternate these medications so that you take one medication every 3 hours. For instance, at noon take ibuprofen, then at 3pm take tylenol, then at 6pm take ibuprofen. Cough: Delsym, an over the counter cough medication may be used every 12 hours as needed. Mucinex XR (guafenisen) 600 mg tablets may be used to thin out the mucous to make it easier to cough up. You may take 1-2 tablets twice per day as needed. Utilizing a vaporizer/humidifier may help, as well as increasing fluids. Sore Throat: Salt water gargles with 1 teaspoon of salt dissolved in 6-8 oz of water as needed can help with a sore throat, as can honey, drinking plenty of liquids, eating soft foods. If severe, can utilize OTC chloraseptic spray. Nasal Congestion: Over the counter allergy medication like Claritin, Allegra or Zyrtec can help with nasal congestion and post nasal drip. Over the counter saline or steroid nasal sprays like Flonase can help with nasal congestion and post nasal drip as well. Over the counter decongestants such as Sudafed may also help. Upset Stomach: Drink plenty of fluids. May utilize Gatorade, Pedialyte, or other electrolyte solutions to supplement. Eat bland foods (ex: BRAT - bananas, rice, applesauce, toast) and slowly advance to other foods as tolerated. Follow up with PCP in 3-5 days. Proceed to  ER if symptoms worsen.

## 2023-08-11 PROCEDURE — 93227 XTRNL ECG REC<48 HR R&I: CPT | Performed by: INTERNAL MEDICINE

## 2023-08-12 ENCOUNTER — TELEPHONE (OUTPATIENT)
Dept: URGENT CARE | Facility: MEDICAL CENTER | Age: 23
End: 2023-08-12

## 2023-08-12 DIAGNOSIS — J02.0 STREP THROAT: Primary | ICD-10-CM

## 2023-08-12 LAB — BACTERIA THROAT CULT: ABNORMAL

## 2023-08-12 RX ORDER — AZITHROMYCIN 250 MG/1
TABLET, FILM COATED ORAL
Qty: 6 TABLET | Refills: 0 | Status: SHIPPED | OUTPATIENT
Start: 2023-08-12 | End: 2023-08-16

## 2023-08-22 ENCOUNTER — TELEPHONE (OUTPATIENT)
Dept: FAMILY MEDICINE CLINIC | Facility: CLINIC | Age: 23
End: 2023-08-22

## 2023-08-22 NOTE — TELEPHONE ENCOUNTER
Voicemail from patient:    Hello, my name is Dexter Ott. I'm a patient of Dr. Diya Duran. I was instructed after my last appointment to complete a couple different blood work things and one of them, the lipid panel, was instructed to be fasting I and doing something for school right now and today is one of my only opportunities to get that done. I ate something really small this morning before I realized like a couple bites of a meal. And I was just basically wondering if I could go in and get that done at 5:00 PM around the eight or nine hour sylvia for that and if that is something I'd be able to do today. So once again, my name is Dexter Ott. I can be reached at [phone]. Thank you so much. LMOM informing patient he should fast 10-12 hours prior to lipid panel and his best bet would be to do it on another day, especially since the labs may not be open that late.

## 2023-08-23 ENCOUNTER — APPOINTMENT (OUTPATIENT)
Dept: LAB | Facility: CLINIC | Age: 23
End: 2023-08-23
Payer: COMMERCIAL

## 2023-08-23 DIAGNOSIS — R00.2 PALPITATIONS: ICD-10-CM

## 2023-08-23 DIAGNOSIS — R10.10 PAIN OF UPPER ABDOMEN: ICD-10-CM

## 2023-08-23 DIAGNOSIS — R79.89 ELEVATED LIVER FUNCTION TESTS: ICD-10-CM

## 2023-08-23 LAB
ALBUMIN SERPL BCP-MCNC: 4.9 G/DL (ref 3.5–5)
ALP SERPL-CCNC: 75 U/L (ref 34–104)
ALT SERPL W P-5'-P-CCNC: 15 U/L (ref 7–52)
ANION GAP SERPL CALCULATED.3IONS-SCNC: 10 MMOL/L
AST SERPL W P-5'-P-CCNC: 16 U/L (ref 13–39)
BASOPHILS # BLD AUTO: 0.04 THOUSANDS/ÂΜL (ref 0–0.1)
BASOPHILS NFR BLD AUTO: 1 % (ref 0–1)
BILIRUB SERPL-MCNC: 0.74 MG/DL (ref 0.2–1)
BUN SERPL-MCNC: 12 MG/DL (ref 5–25)
CALCIUM SERPL-MCNC: 9.6 MG/DL (ref 8.4–10.2)
CHLORIDE SERPL-SCNC: 103 MMOL/L (ref 96–108)
CHOLEST SERPL-MCNC: 143 MG/DL
CO2 SERPL-SCNC: 29 MMOL/L (ref 21–32)
CREAT SERPL-MCNC: 1.06 MG/DL (ref 0.6–1.3)
EOSINOPHIL # BLD AUTO: 0.16 THOUSAND/ÂΜL (ref 0–0.61)
EOSINOPHIL NFR BLD AUTO: 3 % (ref 0–6)
ERYTHROCYTE [DISTWIDTH] IN BLOOD BY AUTOMATED COUNT: 11.8 % (ref 11.6–15.1)
GFR SERPL CREATININE-BSD FRML MDRD: 99 ML/MIN/1.73SQ M
GLUCOSE P FAST SERPL-MCNC: 96 MG/DL (ref 65–99)
HCT VFR BLD AUTO: 41.9 % (ref 36.5–49.3)
HDLC SERPL-MCNC: 43 MG/DL
HGB BLD-MCNC: 14.5 G/DL (ref 12–17)
IMM GRANULOCYTES # BLD AUTO: 0.02 THOUSAND/UL (ref 0–0.2)
IMM GRANULOCYTES NFR BLD AUTO: 0 % (ref 0–2)
LDLC SERPL CALC-MCNC: 85 MG/DL (ref 0–100)
LIPASE SERPL-CCNC: 7 U/L (ref 11–82)
LYMPHOCYTES # BLD AUTO: 1.86 THOUSANDS/ÂΜL (ref 0.6–4.47)
LYMPHOCYTES NFR BLD AUTO: 33 % (ref 14–44)
MCH RBC QN AUTO: 31 PG (ref 26.8–34.3)
MCHC RBC AUTO-ENTMCNC: 34.6 G/DL (ref 31.4–37.4)
MCV RBC AUTO: 90 FL (ref 82–98)
MONOCYTES # BLD AUTO: 0.47 THOUSAND/ÂΜL (ref 0.17–1.22)
MONOCYTES NFR BLD AUTO: 8 % (ref 4–12)
NEUTROPHILS # BLD AUTO: 3.06 THOUSANDS/ÂΜL (ref 1.85–7.62)
NEUTS SEG NFR BLD AUTO: 55 % (ref 43–75)
NRBC BLD AUTO-RTO: 0 /100 WBCS
PLATELET # BLD AUTO: 249 THOUSANDS/UL (ref 149–390)
PMV BLD AUTO: 10 FL (ref 8.9–12.7)
POTASSIUM SERPL-SCNC: 4 MMOL/L (ref 3.5–5.3)
PROT SERPL-MCNC: 7.3 G/DL (ref 6.4–8.4)
RBC # BLD AUTO: 4.68 MILLION/UL (ref 3.88–5.62)
SODIUM SERPL-SCNC: 142 MMOL/L (ref 135–147)
TRIGL SERPL-MCNC: 73 MG/DL
TSH SERPL DL<=0.05 MIU/L-ACNC: 2.78 UIU/ML (ref 0.45–4.5)
WBC # BLD AUTO: 5.61 THOUSAND/UL (ref 4.31–10.16)

## 2023-08-23 PROCEDURE — 80061 LIPID PANEL: CPT

## 2023-08-23 PROCEDURE — 80053 COMPREHEN METABOLIC PANEL: CPT

## 2023-08-23 PROCEDURE — 36415 COLL VENOUS BLD VENIPUNCTURE: CPT

## 2023-08-23 PROCEDURE — 85025 COMPLETE CBC W/AUTO DIFF WBC: CPT

## 2023-08-23 PROCEDURE — 83690 ASSAY OF LIPASE: CPT

## 2023-08-23 PROCEDURE — 84443 ASSAY THYROID STIM HORMONE: CPT

## 2023-08-30 ENCOUNTER — OFFICE VISIT (OUTPATIENT)
Dept: FAMILY MEDICINE CLINIC | Facility: CLINIC | Age: 23
End: 2023-08-30
Payer: COMMERCIAL

## 2023-08-30 VITALS
TEMPERATURE: 97.9 F | OXYGEN SATURATION: 98 % | HEIGHT: 69 IN | SYSTOLIC BLOOD PRESSURE: 130 MMHG | RESPIRATION RATE: 16 BRPM | BODY MASS INDEX: 27.99 KG/M2 | DIASTOLIC BLOOD PRESSURE: 68 MMHG | HEART RATE: 86 BPM | WEIGHT: 189 LBS

## 2023-08-30 DIAGNOSIS — G43.809 OTHER MIGRAINE WITHOUT STATUS MIGRAINOSUS, NOT INTRACTABLE: ICD-10-CM

## 2023-08-30 DIAGNOSIS — K76.0 STEATOSIS, LIVER: ICD-10-CM

## 2023-08-30 DIAGNOSIS — Z00.00 ROUTINE ADULT HEALTH MAINTENANCE: Primary | ICD-10-CM

## 2023-08-30 DIAGNOSIS — R20.2 TINGLING IN EXTREMITIES: ICD-10-CM

## 2023-08-30 PROBLEM — G43.909 MIGRAINE: Status: ACTIVE | Noted: 2023-08-30

## 2023-08-30 PROCEDURE — 99395 PREV VISIT EST AGE 18-39: CPT | Performed by: FAMILY MEDICINE

## 2023-08-30 NOTE — PROGRESS NOTES
ADULT ANNUAL [facility]    NAME: Horace Tafoya  AGE: 26 y.o. SEX: male  : 2000     DATE: 2023     Assessment and Plan:     Problem List Items Addressed This Visit        Digestive    Steatosis, liver     Mild but persistent; advised on avoiding alcohol and diet modifications; still has occasional pain in RUQ; referred to GI         Relevant Orders    Ambulatory Referral to Gastroenterology       Cardiovascular and Mediastinum    Migraine     Had improved in summer having less screen time; reviewed preventative measures; controlled with OTC medication at this time; f/u guidance given should headaches increase in frequency or worsen            Other    Tingling in extremities     Isolated with alcohol use and suspect related to that; advised on limiting alcohol and if continues to experience symptoms f/u for further eval         Routine adult health maintenance - Primary     Advised on diet and exercise; reviewed preventative measures; advised on regular dental visits; reviewed screening recommendations            Immunizations and preventive care screenings were discussed with patient today. Appropriate education was printed on patient's after visit summary. Counseling:  Alcohol/drug use: discussed moderation in alcohol intake, the recommendations for healthy alcohol use, and avoidance of illicit drug use. Dental Health: discussed importance of regular tooth brushing, flossing, and dental visits. · Exercise: the importance of regular exercise/physical activity was discussed. Recommend exercise 3-5 times per week for at least 30 minutes. BMI Counseling: Body mass index is 27.91 kg/m².  The BMI is above normal. Nutrition recommendations include decreasing portion sizes, encouraging healthy choices of fruits and vegetables, decreasing fast food intake, reducing intake of saturated and trans fat and reducing intake of cholesterol. Exercise recommendations include moderate physical activity 150 minutes/week and exercising 3-5 times per week. Rationale for BMI follow-up plan is due to patient being overweight or obese. Return in about 1 year (around 8/30/2024) for Recheck, Annual physical.     Chief Complaint:     Chief Complaint   Patient presents with   • Physical Exam      History of Present Illness:     Adult Annual Physical   Patient here for a comprehensive physical exam. The patient reports no problems. 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 Pt has history of migraines. Excedrin does work when needs it. Responds well to it. Has been feeling okay. Was wondering if there was any supplementation to help with migraine headaches. Seemed worse when had been doing a lot of screen time and improved when off screen as much. Patient notes an episode of tingling in hands and feet after consuming a higher amount of alcohol. Resolved the next day. Denies any symptoms outside of alcohol use. Denies any lightheadedness, dizziness, headache, or palpitations with it. Has not had symptoms since. Diet and Physical Activity  · Diet/Nutrition: poor diet. · Exercise: 3-4 times a week on average. Depression Screening  PHQ-2/9 Depression Screening         General Health  · Sleep: gets 7-8 hours of sleep on average. · Hearing: normal - bilateral.  · Vision: goes for regular eye exams, most recent eye exam <1 year ago and wears glasses. · Dental: no dental visits for >1 year.  Health  · Symptoms include: none     Review of Systems:     Review of Systems   Constitutional: Negative for chills and fever. Respiratory: Negative for shortness of breath. Cardiovascular: Negative for chest pain. Gastrointestinal: Positive for abdominal pain. Neurological: Positive for headaches. Negative for dizziness and light-headedness.       Past Medical History:     Past Medical History:   Diagnosis Date   • Concussion    • 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       Past Surgical History:     Past Surgical History:   Procedure Laterality Date   • CIRCUMCISION     • WISDOM TOOTH EXTRACTION        Family History:     Family History   Problem Relation Age of Onset   • Autoimmune disease Mother    • Hypertension Father       Social History:     Social History     Socioeconomic History   • Marital status: Single     Spouse name: None   • Number of children: None   • Years of education: None   • Highest education level: None   Occupational History   • None   Tobacco Use   • Smoking status: Never   • Smokeless tobacco: Never   Vaping Use   • Vaping Use: Never used   Substance and Sexual Activity   • Alcohol use: Yes   • Drug use: No   • Sexual activity: Not Currently   Other Topics Concern   • None   Social History Narrative   • None     Social Determinants of Health     Financial Resource Strain: Not on file   Food Insecurity: Not on file   Transportation Needs: Not on file   Physical Activity: Not on file   Stress: Not on file   Social Connections: Not on file   Intimate Partner Violence: Not on file   Housing Stability: Not on file      Current Medications:     Current Outpatient Medications   Medication Sig Dispense Refill   • multivitamin (THERAGRAN) TABS Take 1 tablet by mouth daily       No current facility-administered medications for this visit. Allergies: Allergies   Allergen Reactions   • Clindamycin Hives   • Amoxicillin GI Intolerance   • Doxycycline Hives      Physical Exam:     /68 (BP Location: Right arm, Patient Position: Sitting, Cuff Size: Large)   Pulse 86   Temp 97.9 °F (36.6 °C) (Temporal)   Resp 16   Ht 5' 9" (1.753 m)   Wt 85.7 kg (189 lb)   SpO2 98%   BMI 27.91 kg/m²     Physical Exam  Vitals reviewed. Constitutional:       General: He is not in acute distress. Appearance: Normal appearance. He is normal weight. He is not ill-appearing, toxic-appearing or diaphoretic.    HENT:      Head: Normocephalic and atraumatic. Right Ear: Tympanic membrane, ear canal and external ear normal. There is no impacted cerumen. Left Ear: Tympanic membrane, ear canal and external ear normal. There is no impacted cerumen. Nose: Nose normal. No congestion or rhinorrhea. Mouth/Throat:      Mouth: Mucous membranes are moist.      Pharynx: Oropharynx is clear. No oropharyngeal exudate or posterior oropharyngeal erythema. Eyes:      General: No scleral icterus. Right eye: No discharge. Left eye: No discharge. Conjunctiva/sclera: Conjunctivae normal.      Pupils: Pupils are equal, round, and reactive to light. Cardiovascular:      Rate and Rhythm: Normal rate and regular rhythm. Pulses: Normal pulses. Heart sounds: Normal heart sounds. Pulmonary:      Effort: Pulmonary effort is normal.      Breath sounds: Normal breath sounds. Abdominal:      General: Abdomen is flat. There is no distension. Tenderness: There is no abdominal tenderness. There is no guarding or rebound. Musculoskeletal:      Cervical back: Neck supple. No rigidity. Right lower leg: No edema. Left lower leg: No edema. Lymphadenopathy:      Cervical: No cervical adenopathy. Skin:     Findings: No rash. Neurological:      General: No focal deficit present. Mental Status: He is alert and oriented to person, place, and time. Psychiatric:         Mood and Affect: Mood normal.         Behavior: Behavior normal.         Thought Content:  Thought content normal.         Judgment: Judgment normal.          Eliel Singh, DO  [address] [facility]

## 2023-08-30 NOTE — ASSESSMENT & PLAN NOTE
Mild but persistent; advised on avoiding alcohol and diet modifications; still has occasional pain in RUQ; referred to GI

## 2023-08-30 NOTE — ASSESSMENT & PLAN NOTE
Isolated with alcohol use and suspect related to that; advised on limiting alcohol and if continues to experience symptoms f/u for further eval

## 2023-08-30 NOTE — ASSESSMENT & PLAN NOTE
Advised on diet and exercise; reviewed preventative measures; advised on regular dental visits; reviewed screening recommendations

## 2023-08-30 NOTE — ASSESSMENT & PLAN NOTE
Had improved in summer having less screen time; reviewed preventative measures; controlled with OTC medication at this time; f/u guidance given should headaches increase in frequency or worsen; discussed magnesium supplementation

## 2023-10-12 ENCOUNTER — OFFICE VISIT (OUTPATIENT)
Dept: GASTROENTEROLOGY | Facility: CLINIC | Age: 23
End: 2023-10-12
Payer: COMMERCIAL

## 2023-10-12 VITALS
BODY MASS INDEX: 28.14 KG/M2 | WEIGHT: 190 LBS | DIASTOLIC BLOOD PRESSURE: 60 MMHG | TEMPERATURE: 98.7 F | SYSTOLIC BLOOD PRESSURE: 126 MMHG | HEIGHT: 69 IN

## 2023-10-12 DIAGNOSIS — K76.0 STEATOSIS, LIVER: ICD-10-CM

## 2023-10-12 PROCEDURE — 99214 OFFICE O/P EST MOD 30 MIN: CPT | Performed by: PHYSICIAN ASSISTANT

## 2023-10-12 NOTE — PROGRESS NOTES
[facility] - Outpatient Follow-up Note  Nick Gandhi 26 y.o. male MRN: [id number]  Encounter: [id number]          ASSESSMENT AND PLAN:      Kenyon Acosta is a 25 y/o male with fatty liver who presents for follow-up for evaluation of fatty liver. Hepatic steatosis  RUQ pain with movement  Alcohol use, binge   Pt says he gets pain in his RUQ a few times/week, only when he moves "Weird" or is being active. He denies any association with PO intake or Bms; he has no other associated GI symptoms. Abdominal u/s noted his fatty liver and stable splenomegaly from his past mono dx however no gallstones or cholecystitis; borderline hepatomegaly. Pt says he has cut down his alcohol intake to 2 times/week but does admit to drinking at least 6 alcoholic beverages each night that he does drink. Pt is NTTP on physical exam with negative Escobedo's sign and without any bulging when checking for abdominal hernia after asking him to cough   -explained to pt that this pain sounds very MS in nature, more-so like a muscle strain, so I recommend he follow-up with his PCP for this. I explained (and also provided a handout that explained) fatty liver and that this would not be the cause of any pain in the absence of severe hepatomegaly   -GRAY fibrosure ordered to evaluate for fibrosis  -discussed healthy diet, slow/sustained weight loss and exercise,and also discussed cutting down on his alcohol intake as he should try not to drink >2 alcoholic drinks/night     ______________________________________________________________________    SUBJECTIVE:  Pt says he gets RUQ pain when he "moves weird" or is being active. He says he has recently started to be earline active and exercise, and this is when he started to notice this. Pt denies any associated with eating or drinking or moving his bowels.  Pt denies n/v, heartburn, dysphagia, unintentional weight loss, fevers, chills, night sweats, constipation, diarrhea, bloody or black Bms, frequent NSAID use. He suspects his grandmother may have had colon cancer but denies immediate family hx of this. Pt denies prior abdominal surgical hx. Pt says he has cut down on his alcohol intake to only drinking 2 days/week but says he does indeed drink at least 6 alcoholic drinks/night when he does drink. Pt denies tobacco and illicit drug use. REVIEW OF SYSTEMS IS OTHERWISE NEGATIVE. Historical Information   Past Medical History:   Diagnosis Date    Concussion     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      Past Surgical History:   Procedure Laterality Date    CIRCUMCISION      WISDOM TOOTH EXTRACTION       Social History   Social History     Substance and Sexual Activity   Alcohol Use Yes     Social History     Substance and Sexual Activity   Drug Use No     Social History     Tobacco Use   Smoking Status Never   Smokeless Tobacco Never     Family History   Problem Relation Age of Onset    Autoimmune disease Mother     Hypertension Father        Meds/Allergies       Current Outpatient Medications:     multivitamin (THERAGRAN) TABS    Allergies   Allergen Reactions    Clindamycin Hives    Amoxicillin GI Intolerance    Doxycycline Hives           Objective     There were no vitals taken for this visit. There is no height or weight on file to calculate BMI. PHYSICAL EXAM:      General Appearance:   Alert, cooperative, no distress   HEENT:   Normocephalic, atraumatic, anicteric. Neck:  Supple, symmetrical, trachea midline   Lungs:   Clear to auscultation bilaterally; no rales, rhonchi or wheezing; respirations unlabored    Heart[de-identified]   Regular rate and rhythm; no murmur, rub, or gallop.    Abdomen:   Soft, non-tender, non-distended; normal bowel sounds; no masses, no organomegaly    Genitalia:   Deferred    Rectal:   Deferred    Extremities:  No cyanosis, clubbing or edema    Pulses:  2+ and symmetric    Skin:  No jaundice, rashes, or lesions    Lymph nodes:  No palpable cervical lymphadenopathy        Lab Results:   No visits with results within 1 Day(s) from this visit. Latest known visit with results is:   Appointment on 08/23/2023   Component Date Value    Sodium 08/23/2023 142     Potassium 08/23/2023 4.0     Chloride 08/23/2023 103     CO2 08/23/2023 29     ANION GAP 08/23/2023 10     BUN 08/23/2023 12     Creatinine 08/23/2023 1.06     Glucose, Fasting 08/23/2023 96     Calcium 08/23/2023 9.6     AST 08/23/2023 16     ALT 08/23/2023 15     Alkaline Phosphatase 08/23/2023 75     Total Protein 08/23/2023 7.3     Albumin 08/23/2023 4.9     Total Bilirubin 08/23/2023 0.74     eGFR 08/23/2023 99     Lipase 08/23/2023 7 (L)     WBC 08/23/2023 5.61     RBC 08/23/2023 4.68     Hemoglobin 08/23/2023 14.5     Hematocrit 08/23/2023 41.9     MCV 08/23/2023 90     MCH 08/23/2023 31.0     MCHC 08/23/2023 34.6     RDW 08/23/2023 11.8     MPV 08/23/2023 10.0     Platelets 81/89/4396 249     nRBC 08/23/2023 0     Neutrophils Relative 08/23/2023 55     Immat GRANS % 08/23/2023 0     Lymphocytes Relative 08/23/2023 33     Monocytes Relative 08/23/2023 8     Eosinophils Relative 08/23/2023 3     Basophils Relative 08/23/2023 1     Neutrophils Absolute 08/23/2023 3.06     Immature Grans Absolute 08/23/2023 0.02     Lymphocytes Absolute 08/23/2023 1.86     Monocytes Absolute 08/23/2023 0.47     Eosinophils Absolute 08/23/2023 0.16     Basophils Absolute 08/23/2023 0.04     TSH 3RD GENERATON 08/23/2023 2.781     Cholesterol 08/23/2023 143     Triglycerides 08/23/2023 73     HDL, Direct 08/23/2023 43     LDL Calculated 08/23/2023 85          Radiology Results:   No results found.

## 2023-10-12 NOTE — PATIENT INSTRUCTIONS
Non-Alcoholic Fatty Liver Disease   WHAT YOU NEED TO KNOW:   Non-alcoholic fatty liver disease (NAFLD) is a buildup of fat in your liver from a condition other than alcohol use. NAFLD usually does not cause symptoms. You may have pain in the upper right side of your abdomen or feel tired. NAFLD may also be called non-alcohol related fatty liver disease. DISCHARGE INSTRUCTIONS:   Call your local emergency number (911 in the [address]) or have someone call if:   You have shortness of breath. You have trouble thinking clearly or are confused. Return to the emergency department if:   You feel lightheaded or faint. You have shaking, chills, and a fever. Call your doctor or liver specialist if:   You have more pain or swelling in your abdomen. You feel more tired than usual.    You bruise or bleed easily. Your skin or the whites of your eyes look yellow. You have questions or concerns about your condition or care. Medicines:   Medicines  may be given to manage blood sugar or cholesterol levels. Take your medicine as directed. Contact your healthcare provider if you think your medicine is not helping or if you have side effects. Tell your provider if you are allergic to any medicine. Keep a list of the medicines, vitamins, and herbs you take. Include the amounts, and when and why you take them. Bring the list or the pill bottles to follow-up visits. Carry your medicine list with you in case of an emergency. Manage NAFLD:   Ask about medicines and supplements. Some medicines and supplements can harm your liver. Acetaminophen is an example. Talk to your healthcare provider about all your medicines. Do not take any over-the-counter medicine or herbal supplements unless your provider says it is okay. Maintain a healthy weight. Ask your provider how what a healthy weight is for you. Ask your provider to help you create a weight loss plan, if needed. Be physically active, as directed. Aerobic activity 3 times a week for 20 to 45 minutes can help decrease fat buildup in your liver. Examples are cycling, brisk walking, or jogging. Ask your provider about the best physical activity plan for you. Eat a variety of healthy foods. Healthy foods include vegetables, fruit, whole-grain breads, low-fat dairy products, beans, lean meats, and fish. Foods low in simple carbohydrates, high-fructose corn syrup, and trans fat may help decrease fat buildup in your liver. Do not drink alcohol. Alcohol may make NAFLD worse and harm your liver. Ask your provider for information if you need help to quit drinking. Follow up with your doctor or liver specialist as directed: You may need to return for more tests. You may also be referred to a specialist. Write down your questions so you remember to ask them during your visits. © Copyright [employer] Shipman 2023 Information is for End User's use only and may not be sold, redistributed or otherwise used for commercial purposes. The above information is an  only. It is not intended as medical advice for individual conditions or treatments. Talk to your doctor, nurse or pharmacist before following any medical regimen to see if it is safe and effective for you.

## 2023-10-29 PROBLEM — Z00.00 ROUTINE ADULT HEALTH MAINTENANCE: Status: RESOLVED | Noted: 2023-08-30 | Resolved: 2023-10-29

## 2023-12-05 ENCOUNTER — TELEPHONE (OUTPATIENT)
Dept: NEUROLOGY | Facility: CLINIC | Age: 23
End: 2023-12-05

## 2023-12-05 NOTE — TELEPHONE ENCOUNTER
Patient calling to schedule f/u with Dr Mercy Haque for migraine. The frequency increase and wants appointment. Dr Mercy Haque, can I add this patient with yo. LOV- 5/26/21.  Please advise

## 2023-12-06 NOTE — TELEPHONE ENCOUNTER
Hello Good afternoon,     Patient has called back and I was able to schedule with Mikhail Turner first available OVL which was a SD slot on 02/12/24, 3 pm, [facility], and placed on the waiting list. Not sure if you have another slot you would like to eugene him as Mikhail Turner has requested Yes absolutely happy to see him for follow-up, ideally in a 60-minute slot since it has been almost 3 years since I last saw him just so that I have more time to discuss everything. Or at the end of the day where I would have as much time as needed.  Will todd scott as well       Thank you in advance,     Gilbert Hendrixughter

## 2024-01-04 ENCOUNTER — OFFICE VISIT (OUTPATIENT)
Dept: URGENT CARE | Facility: MEDICAL CENTER | Age: 24
End: 2024-01-04
Payer: COMMERCIAL

## 2024-01-04 VITALS
RESPIRATION RATE: 20 BRPM | HEART RATE: 110 BPM | TEMPERATURE: 99.6 F | DIASTOLIC BLOOD PRESSURE: 83 MMHG | HEIGHT: 69 IN | WEIGHT: 199.2 LBS | SYSTOLIC BLOOD PRESSURE: 135 MMHG | BODY MASS INDEX: 29.51 KG/M2 | OXYGEN SATURATION: 98 %

## 2024-01-04 DIAGNOSIS — R51.9 INTRACTABLE HEADACHE, UNSPECIFIED CHRONICITY PATTERN, UNSPECIFIED HEADACHE TYPE: Primary | ICD-10-CM

## 2024-01-04 PROCEDURE — 99213 OFFICE O/P EST LOW 20 MIN: CPT | Performed by: ORTHOPAEDIC SURGERY

## 2024-01-04 RX ORDER — KETOROLAC TROMETHAMINE 30 MG/ML
60 INJECTION, SOLUTION INTRAMUSCULAR; INTRAVENOUS ONCE
Status: COMPLETED | OUTPATIENT
Start: 2024-01-04 | End: 2024-01-04

## 2024-01-04 RX ORDER — DIPHENHYDRAMINE HCL 25 MG
25 TABLET ORAL ONCE
Status: COMPLETED | OUTPATIENT
Start: 2024-01-04 | End: 2024-01-04

## 2024-01-04 RX ADMIN — Medication 25 MG: at 20:59

## 2024-01-04 RX ADMIN — KETOROLAC TROMETHAMINE 60 MG: 30 INJECTION, SOLUTION INTRAMUSCULAR; INTRAVENOUS at 21:00

## 2024-01-05 NOTE — PATIENT INSTRUCTIONS
OTC pain relief such as Tylenol/Ibuprofen, and any combination medication such as Excedrin Migraine may be used   OTC Benadryl is an antihistamine and has been widely used along with other medications for headache and migraine management.   Increasing your water intake is also known to aid in migraine/headache relief and even in treatment of concussions  Follow up with neurologist as scheduled, advise calling the office tomorrow and informing them of your symptoms  Follow up with PCP   Proceed to the ED if symptoms worsen

## 2024-01-05 NOTE — PROGRESS NOTES
[facility]        NAME: Winston Grimm is a 23 y.o. male  : 2000    MRN: [id number]  DATE: 2024  TIME: 8:59 PM    Assessment and Plan   Intractable headache, unspecified chronicity pattern, unspecified headache type [R51.9]  1. Intractable headache, unspecified chronicity pattern, unspecified headache type  ketorolac (TORADOL) injection 60 mg    diphenhydrAMINE (BENADRYL) tablet 25 mg            Patient Instructions     OTC pain relief such as Tylenol/Ibuprofen, and any combination medication such as Excedrin Migraine may be used   OTC Benadryl is an antihistamine and has been widely used along with other medications for headache and migraine management.   Increasing your water intake is also known to aid in migraine/headache relief and even in treatment of concussions  Follow up with neurologist as scheduled, advise calling the office tomorrow and informing them of your symptoms  Follow up with PCP   Proceed to the ED if symptoms worsen    Chief Complaint     Chief Complaint   Patient presents with    Headache     Pt c/o onset of intermittent HA after falling backward  striking posterior neck on a table in November.  Pt has hx of concussion syndrome and states the headaches he got involved his neck.  Now states HA has been constant since Tuesday.  Denies visual disturbances or dizziness.  Has appt with Neurologist he followed for the concussion in ~ 2 weeks.  Taking Pablo-relief w/questionable relief and takes Excedrin Migraine when the HA is worse and gets good relief.         History of Present Illness       23-year-old male presents to the urgent care for evaluation of headache.  The patient was diagnosed with concussion in November after a neck injury.  He has been following up with neurology, but does not have any follow-up appointment until 2 weeks from now.  The patient states today that for the past few days he has been experiencing a constant tension like headache with neck  pain. He states that he has pain along the right temple. He states that the headache is similar to his ongoing headaches secondary to his concussion, though he has never had constant symptoms for 3 days before.  He denies any worsening pain, dizziness, lightheadedness, vision changes, loss of balance, nausea/vomiting.  He currently denies any light sensitivity, but notes that it was present earlier today.  For symptom relief he has been using Pablo-relief and Excedrin. He has not contacted his neurologist yet regarding his symptoms. The patient did test himself for COVID today, which was negative. He denies any fevers, congestion, sore throat, or other cold-like symptoms.          Review of Systems   Review of Systems   Constitutional:  Negative for chills and fever.   HENT:  Negative for congestion, ear pain and sore throat.    Eyes:  Negative for pain and visual disturbance.   Respiratory:  Negative for cough and shortness of breath.    Cardiovascular:  Negative for chest pain and palpitations.   Gastrointestinal:  Negative for abdominal pain, diarrhea, nausea and vomiting.   Genitourinary:  Negative for dysuria and hematuria.   Musculoskeletal:  Positive for neck pain. Negative for arthralgias and back pain.   Skin:  Negative for color change and rash.   Neurological:  Positive for headaches. Negative for dizziness, seizures, syncope, weakness and light-headedness.   Psychiatric/Behavioral: Negative.     All other systems reviewed and are negative.        Current Medications       Current Outpatient Medications:     multivitamin (THERAGRAN) TABS, Take 1 tablet by mouth daily, Disp: , Rfl:     Current Facility-Administered Medications:     diphenhydrAMINE (BENADRYL) tablet 25 mg, 25 mg, Oral, Once, Alycia Beyer PA-C    ketorolac (TORADOL) injection 60 mg, 60 mg, Intramuscular, Once, Alycia Beyer PA-C    Current Allergies     Allergies as of 01/04/2024 - Reviewed 01/04/2024   Allergen Reaction Noted     "Clindamycin Hives 05/26/2021    Amoxicillin GI Intolerance 04/26/2014    Doxycycline Hives 12/18/2021            The following portions of the patient's history were reviewed and updated as appropriate: allergies, current medications, past family history, past medical history, past social history, past surgical history and problem list.     Past Medical History:   Diagnosis Date    Concussion     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        Past Surgical History:   Procedure Laterality Date    CIRCUMCISION      WISDOM TOOTH EXTRACTION         Family History   Problem Relation Age of Onset    Autoimmune disease Mother     Hypertension Father          Medications have been verified.        Objective   /83   Pulse (!) 110   Temp 99.6 °F (37.6 °C) (Tympanic)   Resp 20   Ht 5' 9\" (1.753 m)   Wt 90.4 kg (199 lb 3.2 oz)   SpO2 98%   BMI 29.42 kg/m²        Physical Exam     Physical Exam  Vitals and nursing note reviewed.   Constitutional:       General: He is not in acute distress.     Appearance: Normal appearance. He is not ill-appearing.   HENT:      Head: Normocephalic and atraumatic.      Right Ear: Tympanic membrane normal.      Left Ear: Tympanic membrane normal.      Mouth/Throat:      Pharynx: Oropharynx is clear.   Eyes:      Extraocular Movements: Extraocular movements intact.      Pupils: Pupils are equal, round, and reactive to light.   Cardiovascular:      Rate and Rhythm: Normal rate and regular rhythm.      Pulses: Normal pulses.      Heart sounds: Normal heart sounds.   Pulmonary:      Effort: Pulmonary effort is normal. No respiratory distress.      Breath sounds: No wheezing or rhonchi.   Musculoskeletal:         General: Normal range of motion.      Cervical back: Normal range of motion.   Skin:     General: Skin is warm and dry.      Capillary Refill: Capillary refill takes less than 2 seconds.   Neurological:      General: No focal deficit present.      Mental Status: He is alert and oriented to person, place, " and time.      GCS: GCS eye subscore is 4. GCS verbal subscore is 5. GCS motor subscore is 6.      Cranial Nerves: Cranial nerves 2-12 are intact.      Sensory: Sensation is intact.      Motor: Motor function is intact.      Coordination: Coordination is intact. Romberg sign negative. Coordination normal.      Gait: Gait is intact.      Comments: PEARRLA, EOM intact. No palpable tenderness cervical spine. Full C-spine ROM without complaint.    Psychiatric:         Mood and Affect: Mood normal.         Behavior: Behavior normal.

## 2024-02-05 ENCOUNTER — TELEPHONE (OUTPATIENT)
Dept: NEUROLOGY | Facility: CLINIC | Age: 24
End: 2024-02-05

## 2024-02-05 NOTE — TELEPHONE ENCOUNTER
Called and spoke to patient to confirm their upcoming appointment with Dr. Donovan. Informed patient about arriving in the [place] location 15 minutes prior to their appointment to get checked in and going over chart.

## 2024-02-12 ENCOUNTER — OFFICE VISIT (OUTPATIENT)
Dept: NEUROLOGY | Facility: CLINIC | Age: 24
End: 2024-02-12
Payer: COMMERCIAL

## 2024-02-12 VITALS
BODY MASS INDEX: 30.07 KG/M2 | TEMPERATURE: 98.2 F | SYSTOLIC BLOOD PRESSURE: 158 MMHG | HEART RATE: 91 BPM | DIASTOLIC BLOOD PRESSURE: 64 MMHG | WEIGHT: 203 LBS | HEIGHT: 69 IN

## 2024-02-12 DIAGNOSIS — G43.009 MIGRAINE WITHOUT AURA AND WITHOUT STATUS MIGRAINOSUS, NOT INTRACTABLE: Primary | ICD-10-CM

## 2024-02-12 DIAGNOSIS — G44.329 CHRONIC POST-TRAUMATIC HEADACHE, NOT INTRACTABLE: ICD-10-CM

## 2024-02-12 DIAGNOSIS — H53.9 VISION CHANGES: ICD-10-CM

## 2024-02-12 DIAGNOSIS — Z87.820 H/O CONCUSSION: ICD-10-CM

## 2024-02-12 DIAGNOSIS — G47.33 OSA (OBSTRUCTIVE SLEEP APNEA): ICD-10-CM

## 2024-02-12 PROCEDURE — 99215 OFFICE O/P EST HI 40 MIN: CPT | Performed by: PSYCHIATRY & NEUROLOGY

## 2024-02-12 PROCEDURE — 99417 PROLNG OP E/M EACH 15 MIN: CPT | Performed by: PSYCHIATRY & NEUROLOGY

## 2024-02-12 RX ORDER — RIZATRIPTAN BENZOATE 10 MG/1
10 TABLET ORAL ONCE AS NEEDED
Qty: 9 TABLET | Refills: 12 | Status: SHIPPED | OUTPATIENT
Start: 2024-02-12

## 2024-02-12 NOTE — PATIENT INSTRUCTIONS
MRI Brain ordered      -If you feel like you could sleep on your back that night only, certainly could try to sleep on your back for the sleep study, but not if you feel like you cannot sleep this way.  Just getting sleep is the most important thing, as the machine thinks you are sleeping the entire time it is plug in. Otherwise we discussed home sleep study can underestimate the problem and try not to plug in the machine until you are just about to fall asleep.  If it comes back that you almost have sleep apnea or other sleep disorder, but not meeting criteria, we could consider in lab study and reach out to me if you would like this ordered before next visit.    I am placing a referral for a sleep study and if it is abnormal we will place one to the sleep medicine specialist team to further evaluate your sleep issues.    Please call [phone] to schedule the sleep study and afterwards if needed I recommend you see one of the following providers: I think you can still schedule on veronica Garcia DO   Duke Hathaway PA-C      Headache/migraine treatment:   Abortive medications (for immediate treatment of a headache):   It is ok to take ibuprofen, acetaminophen or naproxen (Advil, Tylenol,  Aleve, Excedrin) if they help your headaches you should limit these to No more than 3 times a week to avoid medication overuse/rebound headaches.     We discussed you could continue to take Excedrin Migraine although be cautious with amount of aspirin in it, consider Excedrin tension instead which has no aspirin and has similar effect for many  Ibuprofen/Advil/Motrin use no more than 3 days/week ideally to prevent medication overuse headache or stomach irritation    You can take this on its own or in combination with an NSAID  For your more moderate to severe migraines take this medication early   Maxalt (rizatriptan) 10mg  tabs - take one at the onset of headache. May repeat one time after 2 hours if pain has not resolved.   (Max 2 a day and 9 a month)     Over the counter preventive supplements for headaches/migraines (if you try, try for 3 months straight)  (to take every day to help prevent headaches - not to take at the time of headache):  There are combo pills online of these - none of which regulated by FDA and double check dosing - take appropriate dose only once a day- preventa migraine, migravent, mind ease, migrelief   [] Magnesium 400mg daily (If any diarrhea or upset stomach, decrease dose  as tolerated)  [] Riboflavin (Vitamin B2) 400mg daily - try online   (FYI B2 may make your urine bright/neon yellow)  AND/OR  [] Herbal medication: Petasites/Butterbur 150 mg daily - try online  (When choosing your Butterbur online or in the store, beware that there are some poor preps containing pyrrolizidine alkaloids (PAs) that can be harmful to the liver. Therefore, do not use butterbur products that are not labeled as PA-free.)    Prescription preventive medications for headaches/migraines   (to take every day to help prevent headaches - not to take at the time of headache):  [x] We have options if needed      Lifestyle Recommendations:  [x] SLEEP - Maintain a regular sleep schedule: Adults need at least 7-8 hours of uninterrupted a night. Maintain good sleep hygiene:  Going to bed and waking up at consistent times, avoiding excessive daytime naps, avoiding caffeinated beverages in the evening, avoid excessive stimulation in the evening and generally using bed primarily for sleeping.  One hour before bedtime would recommend turning lights down lower, decreasing your activity (may read quietly, listen to music at a low volume). When you get into bed, should eliminate all technology (no texting, emailing, playing with your phone, iPad or tablet in bed).  [x] HYDRATION - Maintain good hydration.  Drink  2L of fluid a day (4 typical  small water bottles)  [x] DIET - Maintain good nutrition. In particular don't skip meals and try and eat healthy balanced meals regularly.  [x] TRIGGERS - Look for other triggers and avoid them: Limit caffeine to 1-2 cups a day or less. Avoid dietary triggers that you have noticed bring on your headaches (this could include aged cheese, peanuts, MSG, aspartame and nitrates).  [x] EXERCISE - physical exercise as we all know is good for you in many ways, and not only is good for your heart, but also is beneficial for your mental health, cognitive health and  chronic pain/headaches. I would encourage at the least 5 days of physical exercise weekly for at least 30 minutes.     Education and Follow-up  [x] Please call with any questions or concerns. Of course if any new concerning symptoms go to the emergency department.  [x] Follow up, 2-3 months, sooner if needed    - As we discussed if there are no abnormalities on the brain MRI that need urgent intervention (very often there are incidental findings that may not mean anything significant and are better discussed in person), you will not necessarily receive a call from us, but feel free to call in to check on the status of the report (since not knowing can be anxiety provoking) and the nurses will let you know the result or make sure I have nothing to pass on regarding the results first.  Ideally, all of this will be discussed in detail in the follow-up visit.

## 2024-02-12 NOTE — PROGRESS NOTES
[facility] Consult - Follow up   PATIENT:  Winston Grimm  MRN:  [id number]  :  2000  DATE OF SERVICE:  2024  REFERRED BY: No ref. provider found  PMD: Neena Romeo DO    Assessment/Plan:   Winston Grimm is a delightful 23 y.o. male with a past medical history that includes elevated LFTs (now normal), liver steatosis, chronic back pain, Pars defect (ortho and PT), hives, COVID early 2021, migraine, mononucleosis 2021, palpitations (cardiology testing 2023), testicular pain, rash related to sulfa 3/25/2022 referred here for evaluation of headache.  My initial evaluation 2021     Migraine without aura and without status migrainosus, not intractable  H/O concussion  Chronic post-traumatic headache -we discussed if headaches are related to his head trauma would label at this rather than still concussion at this point  Vision changes  Suspected CASSI (obstructive sleep apnea)  He was seen originally 2021 following possible concussion and persistent posttraumatic headaches and we discussed at the time his history of recent head trauma.  He had history of migraine prior which is what he returns for continued care of on subsequent follow-up. We discussed incident 2020 and 2021 do not sound like concussion since he did not have typical acute concussion symptoms within 24 hours.  3/30/2021 possible but not probable concussion.  Throughout this time he is also dealing with migraine symptoms of COVID from 2021 and diagnosed with mono in 2021.  He has a history of migraines in his youth and a family history of migraines.  -   At baseline normally headaches about once a week, more significant migraines when younger.  - as of 2021:  Gradually improving Posttraumatic headache with migrainous features vs headaches related to mono with about 2-3 headaches per week lasting 30-60 mins about 2-3/10 in severity that self  resolve.  We discussed symptoms should continue to improve and return if they do not.  He never returned for follow-up  - as of 2/12/2024: He returns nearly 3 years later reporting he continues to have chronic headaches and migraines as he did even prior to his concussions and we discussed this is likely due to a personal and family history of headaches and migraines.  He also has had 2 hits to the head that may or may not have been concussion, but discussed regardless and these included during a pickup possible game 4/2023, elbowed on the head without acute symptoms, and the following days felt sort of out of it, after 1 drink was dizzy at a friend's apartment and symptoms lasted maybe a week or so before return to normalcy.  That summer while working full-time and on a computer all day he had a bit more headaches maybe once a week which we discussed was his pre-existing baseline from past history.  In November 2023 while drinking he stood up quickly fell back hitting his neck/occipital region on a table and had typical acute concussion symptoms of emotional lability, headache the next day, fell out of it the next couple days and the following week had more headaches.  Headaches were every other day in December 2023 until early January he had a migraine cocktail of sorts with ketorolac/Toradol and diphenhydramine/Benadryl that helped break the migraine cycle.  He reports he is back to headaches 1-2 times a week and we discussed MRI brain to further evaluate for features of increased intracranial pressure.  Sleep study to look for sleep disorder contributing to prolonged symptoms after concussion which is a frequent factor I am finding in my patients and he does have some symptoms of such as well as a possible family history of such.  He is not currently interested in prescription preventative and since OTC meds do not always work we discussed trial of rizatriptan for migraine rescue.    Workup:  -Due to increased  frequency and severity of headaches and migraines, some vision changes history of repetitive traumatic brain injury, abnormal neurologic exam, as well as concern for increased intracranial pressure which requires ruling out nefarious pathology in the brain increasing pressure, recommend further evaluation with MRI brain with and without contrast to rule out structural or treatable causes of symptoms.  We will pay close attention to the cerebellar tonsils to see if there is any cerebellar ectopia or signs of increased intracranial pressure.    Preventative:  - we discussed headache hygiene and lifestyle factors that may improve headaches  -We discussed multiple prescription options and he did take headache preventative supplements for 1 month and we discussed if trying something for prevention, would take for up to 3 months before saying whether it works or not  - Past/ failed/contraindicated: none   - future options: topiramate, verapamil, CGRP     Abortive:  - discussed not taking over-the-counter or prescription pain medications more than 3 days per week to prevent medication overuse/rebound headache  -    trial of rizatriptan (MAXALT) 10 mg tablet; Take 1 tablet (10 mg total) by mouth once as needed for migraine May repeat in 2 hours if needed. Max 2/24 hours, 9/month. Discussed proper use, possible side effects and risks.  - Past/ failed/contraindicated: OTC meds   - future options: Alternative triptan,  prochlorperazine, Toradol IM or p.o., could consider trial for 5 days of Depakote or dexamethasone 4 prolonged migraine, ubrelvy, reyvow, nurtec      We have discussed concussions and the natural course of recovery. We have discussed that symptoms from a concussion typically take 2 weeks to resolve, and although sometimes it can feel like concussion symptoms linger on, at this point these symptoms would be related to contributing factors.    - Contributing factors may include:    posttraumatic headache,    "preexisting migraines,  Family history of migraines, anxiety or depression, stress, deconditioning,  comorbid medical diagnoses, young age.  - I have recommended gradual return of normal cognitive and physical activity with safety precautions  - We discussed that newer research regarding concussion shows that the sooner one returns gradually to their normal physical and cognitive routine, the sooner one tends to recover. Prolonged removal from normal routine and deconditioning have been shown to prolong symptoms  - We discussed that sometimes this constellation of symptoms is referred to as \"post concussion syndrome,\" but I prefer not to use this term since that can be misleading and make people think they are still brain injured or \"concussed,\" when the most common and likely etiology this far out from the head trauma is either contributing factors or a form of functional neurologic disorder with mixed symptoms  - We discussed how cognitive issues can have multiple causes and often related to multifactorial etiologies including stress, anxiety,  mood, pain, hypervigilance  and sleep issues and provided reassurance that, it is not likely the cognitive dysfunction is related to concussion at this point.   - Safe driving precautions, should not drive at all if feeling sleepy or cognitively not well.      CASSI (obstructive sleep apnea)  -     Ambulatory Referral to Sleep Medicine; Future      Patient instructions       MRI Brain ordered      -If you feel like you could sleep on your back that night only, certainly could try to sleep on your back for the sleep study, but not if you feel like you cannot sleep this way.  Just getting sleep is the most important thing, as the machine thinks you are sleeping the entire time it is plug in. Otherwise we discussed home sleep study can underestimate the problem and try not to plug in the machine until you are just about to fall asleep.  If it comes back that you almost have " sleep apnea or other sleep disorder, but not meeting criteria, we could consider in lab study and reach out to me if you would like this ordered before next visit.    I am placing a referral for a sleep study and if it is abnormal we will place one to the sleep medicine specialist team to further evaluate your sleep issues.    Please call [phone] to schedule the sleep study and afterwards if needed I recommend you see one of the following providers: I think you can still schedule on veronica Garcia DO   Duke Hathaway PA-C      Headache/migraine treatment:   Abortive medications (for immediate treatment of a headache):   It is ok to take ibuprofen, acetaminophen or naproxen (Advil, Tylenol,  Aleve, Excedrin) if they help your headaches you should limit these to No more than 3 times a week to avoid medication overuse/rebound headaches.     We discussed you could continue to take Excedrin Migraine although be cautious with amount of aspirin in it, consider Excedrin tension instead which has no aspirin and has similar effect for many  Ibuprofen/Advil/Motrin use no more than 3 days/week ideally to prevent medication overuse headache or stomach irritation    You can take this on its own or in combination with an NSAID  For your more moderate to severe migraines take this medication early   Maxalt (rizatriptan) 10mg tabs - take one at the onset of headache. May repeat one time after 2 hours if pain has not resolved.   (Max 2 a day and 9 a month)     Over the counter preventive supplements for headaches/migraines (if you try, try for 3 months straight)  (to take every day to help prevent headaches - not to take at the time of headache):  There are combo pills online of these - none of which regulated by FDA and double check dosing - take appropriate dose only once a day- preventa migraine, migravent, mind ease,  migrelief   [] Magnesium 400mg daily (If any diarrhea or upset stomach, decrease dose  as tolerated)  [] Riboflavin (Vitamin B2) 400mg daily - try online   (FYI B2 may make your urine bright/neon yellow)  AND/OR  [] Herbal medication: Petasites/Butterbur 150 mg daily - try online  (When choosing your Butterbur online or in the store, beware that there are some poor preps containing pyrrolizidine alkaloids (PAs) that can be harmful to the liver. Therefore, do not use butterbur products that are not labeled as PA-free.)    Prescription preventive medications for headaches/migraines   (to take every day to help prevent headaches - not to take at the time of headache):  [x] We have options if needed      Lifestyle Recommendations:  [x] SLEEP - Maintain a regular sleep schedule: Adults need at least 7-8 hours of uninterrupted a night. Maintain good sleep hygiene:  Going to bed and waking up at consistent times, avoiding excessive daytime naps, avoiding caffeinated beverages in the evening, avoid excessive stimulation in the evening and generally using bed primarily for sleeping.  One hour before bedtime would recommend turning lights down lower, decreasing your activity (may read quietly, listen to music at a low volume). When you get into bed, should eliminate all technology (no texting, emailing, playing with your phone, iPad or tablet in bed).  [x] HYDRATION - Maintain good hydration.  Drink  2L of fluid a day (4 typical small water bottles)  [x] DIET - Maintain good nutrition. In particular don't skip meals and try and eat healthy balanced meals regularly.  [x] TRIGGERS - Look for other triggers and avoid them: Limit caffeine to 1-2 cups a day or less. Avoid dietary triggers that you have noticed bring on your headaches (this could include aged cheese, peanuts, MSG, aspartame and nitrates).  [x] EXERCISE - physical exercise as we all know is good for you in many ways, and not only is good for your heart, but also is  beneficial for your mental health, cognitive health and  chronic pain/headaches. I would encourage at the least 5 days of physical exercise weekly for at least 30 minutes.     Education and Follow-up  [x] Please call with any questions or concerns. Of course if any new concerning symptoms go to the emergency department.  [x] Follow up, 2-3 months, sooner if needed    - As we discussed if there are no abnormalities on the brain MRI that need urgent intervention (very often there are incidental findings that may not mean anything significant and are better discussed in person), you will not necessarily receive a call from us, but feel free to call in to check on the status of the report (since not knowing can be anxiety provoking) and the nurses will let you know the result or make sure I have nothing to pass on regarding the results first.  Ideally, all of this will be discussed in detail in the follow-up visit.       CC:   We had the pleasure of evaluating Winston Grimm in neurological consultation today. Winston Grimm is a   right handed male who presents today for evaluation of headaches.     History obtained from patient as well as available medical record review.  History of Present Illness:   Interval history as of 2/12/2024  - he returns nearly 3 years later reporting he has had 2 hits to the head with possible concussion since he was last year.  4/2023 and 11/2023.  He of course has been having headaches for other reasons as well on chart review I see that he had headaches in ER 3/25/2022 when he was dealing with rash.  Followed up with urgent care 1/4/2024 for intractable headache.  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 [place] early June   - April 2023  basketball and driving for lay up and turned body and caught elbow to the the temple and took a second to process, stepped back and sat down and then stopped playing and then went inside no symptoms otherwise that day, felt fine that day,  in the following days felt sort of out of it and a couple of days later got dizzy at a friends apartment, one drink only that night, took a moment outside and went home - symptoms lasted maybe 1 week or so felt normal   - that summer started working full time and on computer all day and had a bit more headaches maybe once a week   - Nov 2023, out one night with friends, went from [place] style to standing and actually fell back hitting the mid neck portion substantially on the corner edge of a table, more emotional after was tearful out of worry with gf which is atypical for his mood, does not recall if he had a headache the next day, felt out of it the next couple of days and the following week after that more headaches - more often, woke up and went to bed with them, not responding to advil like they used to - then every other day for a while In dec, then in January 3 day headache and went to urgent care and was given Toradol and Benadryl and that helped break migraine cycle.  - last eye exam summer 2023, wears glasses and they changed his prescription which increased a little bit and there is a new eye doctor rushing him and the new prescription did not feel right, things far were better on old pair, still wearing lenses now     Headaches and migraines   For a while he was getting headaches every other day, in January went to urgent care and was given Toradol and Benadryl and that helped break migraine cycle.  He was headache free for a week but since they have come back to 1-2 times a week    - vary, occipital region bilaterally - tight and pressure,   Sometimes bitemporal and top of head/apex  Sometimes just unilateral retro-orbital either left or right - equally   Without aura  No N/V  +p/p    Over a day now can last up to 3     Preventative:   - In dec was taking migrelief and didn't help enough over one month and then stopped since pricey     Abortive:   - exedrin works less recently or advil better than  exedrin but not always   Denies bothersome side effects     Sleep  7-9 hours, less on weekends, tried to prioritize sleep the past year, prior was all over the place  Sometimes problems falling asleep, nothing major, maybe an hour, often related to caffeine  Wakes up once a night to pee or maybe due to apnea   Side sleeper, sometimes wakes up on back   Snores worse when drinking  Dad has sleep apnea likely   Class III Mallampati score  Sleep is not always restful, naps during day sometimes      History as of initial visit 5/26/2021  - does NOT sound like a classic concussion by current definition    9/19/20 he fell off his skateboard and hit his chin and palm on a gravel road   acute symptoms included: No LOC, no amnesia, felt fine. no dx of concussion  ED - stitches - 2 days later - woke up and felt a little off, a little trouble concentrating, no significant headache   -  NOT a concussion    2/26/2021 hit his head on a cabinet bar when leaning over and coming up hit back of head   acute symptoms included:  No loc, no amnesia. Head trauma was very minor any did not think anything of it because there is no symptoms after the injury.  The following day his head hurt in the region waxing and waning.    -NOT a concussion  3/3/21 - He went to the emergency room due to persistent coughing since diagnosed with COVID -not a concussion - referred to neuro  -  04/05/2021 ED due to lingering COVID issues to rule out bronchitis or other issues - denied any headache, dizziness, nausea, vomiting, weakness.   - possible but not probable concussion on 3/30/21 -  04/08/2021 urgent care then ED where he reported on 03/30/2021 he was jumping up and hit the top of his head on a 4 x 4 piece of wood on ceiling.  No LOC,  No amnesia, no nausea, no vomiting, felt off, following days trouble concentrating. 4/8/21 reported to ED feeling tired, pupils both dilated, subjective incoordination with normal exam,  Headache, tinnitus, dizziness -  turns out he had mono  -  ED 04/14/2021 for nose bleed    Subsequently has been following up with Pediatrics and PT,  See EMR    Headaches started at what age? Around 12 years old  How often do the headaches occur?   - migraines when younger  - normally headaches like once a week   - as of 5/26/2021: 2-3 headaches per week lasting 30-60 mins about 2-3/10  What time of the day do the headaches start?  No particular time of day   How long do the headaches last? Resolve in 30-60 mins, not anymore, over a day, 3 days   Are you ever headache free? Yes - MRI Brain 2/2010    Aura? without aura     Last eye exam: 5/2021 - wears glasses    Where is your headache located and pain quality?   - top middle of head, dull throbbing     What is the intensity of pain? Average: 2-3/10, worst 6/10 - doesn't get these anymore  Associated symptoms:   [] Nausea       [] Vomiting        [x] Problems with concentration - in class for over an hour will be difficult to listen at times, may have a headache at time  [x] Photophobia - a little     [x]Phonophobia      [] Osmophobia  [] Blurred vision   [] Tinnitus - random - 1 minute   [] Hands or feet tingle or feel numb/paresthesias     [x]Sometimes tingling towards back of neck     [] Ptosis      [] Facial droop  [] Lacrimation  [] Nasal congestion/rhinorrhea        Things that make the headache worse? No specific movements    Headache triggers:  Unknown      Have you seen someone else for headaches or pain? Yes, when younger   Have you had trigger point injection performed and how often? No  Have you had Botox injection performed and how often? No   Have you had epidural injections or transforaminal injections performed? No  Have you ever had any Brain imaging? Maybe when little, as a young kid he thinks so     What medications do you take or have you taken for your headaches?   ABORTIVE:    Nothing lately     PREVENTIVE:   -       Past/ failed/contraindicated:  -     Alternative therapies  used in the past for headaches? PT    LIFESTYLE  Sleep   - averages: 7 hours   Problems falling asleep?:   No  Problems staying asleep?:  No    Physical activity:   -  bball, running, biking    Water: 3-4, 32 oz bottles per day  Caffeine: coffee - 2 cups-  per day    Mood: good  Stressed in the beginning   Denies history of anxiety or depression or other diagnosed mood disorder    The following portions of the patient's history were reviewed and updated as appropriate: allergies, current medications, past family history, past medical history, past social history, past surgical history and problem list.    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 - going into sophomore year - good student   Lives with my parents     Illicit Drugs: denies  Alcohol/tobacco: Denies alcohol use, Denies tobacco use    Past Medical History:     Past Medical History:   Diagnosis Date    Concussion     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        Patient Active Problem List   Diagnosis    Pars defect    Lumbar back pain    Infectious mononucleosis    Elevated liver function tests    Pain of upper abdomen    Urinary frequency    Palpitations    Strep throat    Tingling in extremities    Steatosis, liver    Migraine       Medications:      Current Outpatient Medications   Medication Sig Dispense Refill    multivitamin (THERAGRAN) TABS Take 1 tablet by mouth daily      rizatriptan (MAXALT) 10 mg tablet Take 1 tablet (10 mg total) by mouth once as needed for migraine May repeat in 2 hours if needed. Max 2/24 hours, 9/month. 9 tablet 12     No current facility-administered medications for this visit.        Allergies:      Allergies   Allergen Reactions    Clindamycin Hives    Amoxicillin GI Intolerance    Doxycycline Hives  "      Family History:     Family History   Problem Relation Age of Onset    Autoimmune disease Mother     Hypertension Father        Social History:     Social History     Socioeconomic History    Marital status: Single     Spouse name: Not on file    Number of children: Not on file    Years of education: Not on file    Highest education level: Not on file   Occupational History    Not on file   Tobacco Use    Smoking status: Never    Smokeless tobacco: Current     Types: Chew   Vaping Use    Vaping status: Never Used   Substance and Sexual Activity    Alcohol use: Yes    Drug use: No    Sexual activity: Not Currently   Other Topics Concern    Not on file   Social History Narrative    Not on file     Social Determinants of Health     Financial Resource Strain: Not on file   Food Insecurity: Not on file   Transportation Needs: Not on file   Physical Activity: Not on file   Stress: Not on file   Social Connections: Not on file   Intimate Partner Violence: Not on file   Housing Stability: Not on file         Objective:       Physical Exam:                                                                 Vitals:            Constitutional:    /64 (BP Location: Right arm, Patient Position: Sitting, Cuff Size: Standard)   Pulse 91   Temp 98.2 °F (36.8 °C) (Temporal)   Ht 5' 9\" (1.753 m)   Wt 92.1 kg (203 lb)   BMI 29.98 kg/m²   BP Readings from Last 3 Encounters:   02/12/24 158/64   01/04/24 135/83   10/12/23 126/60     Pulse Readings from Last 3 Encounters:   02/12/24 91   01/04/24 (!) 110   08/30/23 86         Well developed, well nourished, well groomed. No dysmorphic features.       HEENT:  Normocephalic atraumatic.   Oropharynx appears clear and moist. No oral mucosal lesions noted.   Chest:  Respirations appear regular and unlabored.    Cardiovascular:  Distal extremities warm without palpable edema or tenderness, no observed significant swelling.    Musculoskeletal:  (see below under neurologic exam for " evaluation of motor function and gait)   Skin:  warm and dry. No apparent birthmarks or stigmata of neurocutaneous disease.   Psychiatric:  Normal behavior and appropriate affect        Neurological Examination:     Mental status/cognitive function:   Recent and remote memory intact. Attention span and concentration as well as fund of knowledge are appropriate for age. Normal language and spontaneous speech.     Cranial Nerves:  II-visual fields full.   Fundi poorly visualized due to pupillary constriction  III, IV, VI-Pupils were equal, round, and reactive to light and accommodation. Extraocular movements were full and conjugate without nystagmus.    V-facial sensation symmetric.    VII-facial expression symmetric, intact forehead wrinkle, strong eye closure, symmetric smile    VIII-hearing grossly intact bilaterally   IX, X-palate elevation symmetric, no dysarthria.   XI-shoulder shrug strength intact    XII-tongue protrusion midline.    Motor Exam: symmetric bulk and tone throughout, no pronator drift. Power/strength 5/5 bilateral upper and lower extremities, no atrophy, fasciculations or abnormal movements noted.   Sensory: grossly intact light touch in all extremities.   Reflexes: brachioradialis 2+, biceps L1+R2+, knee 2+, ankle 2+ bilaterally. No ankle clonus   Coordination: Finger nose finger intact bilaterally, no apparent dysmetria, ataxia or tremor noted  Gait: steady casual and tandem gait.       Pertinent lab results:  See EMR for recent labs  - 8/23/2023 CMP and CBC unremarkable  TSH normal   05/21/2021 CMP unremarkable    05/05/2021 CBC unremarkable, elevated AST and ALT (mono)   04/14/2021 CBC unremarkable      Imaging:   No head imaging noted in system    MRI Brain images from 2/23/10 -at [facility]-do not have images or report currently     Review of Systems:   ROS obtained by medical assistant and personally reviewed, but if any symptoms listed below say negative, does not mean patient has not  had this symptom since last visit, please see HPI for details of symptoms discussed this visit. I recommended PCP follow up for non neurologic problems.    Review of Systems   Constitutional:  Negative for appetite change and fever.   HENT: Negative.  Negative for hearing loss, tinnitus, trouble swallowing and voice change.    Eyes: Negative.  Negative for photophobia and pain.   Respiratory: Negative.  Negative for shortness of breath.    Cardiovascular: Negative.  Negative for palpitations.   Gastrointestinal: Negative.  Negative for nausea and vomiting.   Endocrine: Negative.  Negative for cold intolerance.   Genitourinary: Negative.  Negative for dysuria, frequency and urgency.   Musculoskeletal: Negative.  Negative for myalgias and neck pain.   Skin: Negative.  Negative for rash.   Neurological:  Positive for dizziness and headaches. Negative for tremors, seizures, syncope, facial asymmetry, speech difficulty, weakness, light-headedness and numbness.        Brain fog -denies safety issues driving  Hematological: Negative.  Does not bruise/bleed easily.   Psychiatric/Behavioral: Negative.  Negative for confusion, hallucinations and sleep disturbance.            I have spent 60 minutes with Patient  today in which greater than 50% of this time was spent in counseling/coordination of care regarding Diagnostic results, Prognosis, Risks and benefits of tx options, Instructions for management, Patient  education, Importance of tx compliance, Risk factor reductions, Impressions, Counseling / Coordination of care, Documenting in the medical record, Reviewing / ordering tests, medicine, procedures  , Obtaining or reviewing history  , and Communicating with other healthcare professionals . I also spent 14 minutes non face to face for this patient the same day.       Author:  Maral Donovan MD 2/12/2024 6:45 PM

## 2024-02-12 NOTE — PROGRESS NOTES
Review of Systems   Constitutional:  Negative for appetite change and fever.   HENT: Negative.  Negative for hearing loss, tinnitus, trouble swallowing and voice change.    Eyes: Negative.  Negative for photophobia and pain.   Respiratory: Negative.  Negative for shortness of breath.    Cardiovascular: Negative.  Negative for palpitations.   Gastrointestinal: Negative.  Negative for nausea and vomiting.   Endocrine: Negative.  Negative for cold intolerance.   Genitourinary: Negative.  Negative for dysuria, frequency and urgency.   Musculoskeletal: Negative.  Negative for myalgias and neck pain.   Skin: Negative.  Negative for rash.   Neurological:  Positive for dizziness and headaches. Negative for tremors, seizures, syncope, facial asymmetry, speech difficulty, weakness, light-headedness and numbness.        Brain fog   Hematological: Negative.  Does not bruise/bleed easily.   Psychiatric/Behavioral: Negative.  Negative for confusion, hallucinations and sleep disturbance.

## 2024-02-14 DIAGNOSIS — G47.33 OSA (OBSTRUCTIVE SLEEP APNEA): Primary | ICD-10-CM

## 2024-02-27 ENCOUNTER — HOSPITAL ENCOUNTER (OUTPATIENT)
Dept: RADIOLOGY | Facility: HOSPITAL | Age: 24
Discharge: HOME | End: 2024-02-27
Attending: PSYCHIATRY & NEUROLOGY
Payer: COMMERCIAL

## 2024-02-27 DIAGNOSIS — Z87.820 H/O CONCUSSION: ICD-10-CM

## 2024-02-27 DIAGNOSIS — G44.329 CHRONIC POST-TRAUMATIC HEADACHE, NOT INTRACTABLE: ICD-10-CM

## 2024-02-27 DIAGNOSIS — G43.009 MIGRAINE WITHOUT AURA AND WITHOUT STATUS MIGRAINOSUS, NOT INTRACTABLE: ICD-10-CM

## 2024-02-27 DIAGNOSIS — H53.9 VISION CHANGES: ICD-10-CM

## 2024-02-27 PROCEDURE — G1004 CDSM NDSC: HCPCS

## 2024-02-27 PROCEDURE — 70553 MRI BRAIN STEM W/O & W/DYE: CPT

## 2024-02-27 PROCEDURE — A9585 GADOBUTROL INJECTION: HCPCS | Performed by: PSYCHIATRY & NEUROLOGY

## 2024-02-27 RX ORDER — GADOBUTROL 604.72 MG/ML
9 INJECTION INTRAVENOUS
Status: COMPLETED | OUTPATIENT
Start: 2024-02-27 | End: 2024-02-27

## 2024-02-27 RX ADMIN — GADOBUTROL 9 ML: 604.72 INJECTION INTRAVENOUS at 08:47

## 2024-02-29 ENCOUNTER — HOSPITAL ENCOUNTER (OUTPATIENT)
Dept: SLEEP CENTER | Facility: CLINIC | Age: 24
Discharge: HOME | End: 2024-02-29
Payer: COMMERCIAL

## 2024-02-29 DIAGNOSIS — G47.33 OSA (OBSTRUCTIVE SLEEP APNEA): ICD-10-CM

## 2024-02-29 PROCEDURE — G0399 HOME SLEEP TEST/TYPE 3 PORTA: HCPCS

## 2024-02-29 NOTE — PROGRESS NOTES
Home Sleep Study Documentation    HOME STUDY DEVICE: Noxturnal no                                           Hortencia G3 yes      Pre-Sleep Home Study:    Set-up and instructions performed by: Jen    Technician performed demonstration for Patient: yes    Return demonstration performed by Patient: yes    Written instructions provided to Patient: yes    Patient signed consent form: yes        Post-Sleep Home Study:    Additional comments by Patient: pending    Home Sleep Study Failed:pending    Failure reason: pending    Reported or Detected: pending    Scored by: pending

## 2024-03-04 PROBLEM — G47.33 OSA (OBSTRUCTIVE SLEEP APNEA): Status: ACTIVE | Noted: 2024-03-04

## 2024-03-07 ENCOUNTER — TELEPHONE (OUTPATIENT)
Dept: NEUROLOGY | Facility: CLINIC | Age: 24
End: 2024-03-07

## 2024-03-07 NOTE — TELEPHONE ENCOUNTER
received vm 3/6 at 12:58pm-Hi, this is Winston arshad calling about the results of my recent MRI exam. My YOB: 2000. And I was calling to just see if Dr. Donovan was able to Kind of talk to me about some of those test results and maybe just explain a little bit of the test results just as far as just ca like peace of mind and, and my understanding of what was getting back to me. Uh, a good number to reach me at is [phone]. Thank you so much. Have a great day.  ---------------------------------------------  Left message for pt confirming vm was received and will call him back with results    Please advise

## 2024-03-12 NOTE — TELEPHONE ENCOUNTER
Please let him know that I wrote him regarding the sleep study, and I reached out to our vascular specialist Dr. Echevarria regarding one of the incidental findings mentioned in the body of the MRI report, just to get his opinion and I am still waiting to hear back from him.     Did he have specific questions he was otherwise wondering about?  Does he want a sooner appointment to discuss everything?

## 2024-03-15 PROBLEM — G47.8 OTHER SLEEP DISORDERS: Status: ACTIVE | Noted: 2024-03-15

## 2024-03-15 NOTE — TELEPHONE ENCOUNTER
"It does not look like he has read the [employer] message regarding my last message yet.  Could you please let him know that I reached out to the vascular specialist Dr. 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 Echevarria to evaluate the imaging and make sure there is nothing that needed to be changed or recommended based on this statement.  Dr. Echevarria our vascular/stroke specialist stated:    \" it appears that they are referring to hyperintensity on DWI which does not represent stroke, just a signal.  In both the body and impression they indicate no actual stroke.  This is just the radiologist addressing the signal on the MRI and indicating that it is not a stroke.\"    The reason they did not put in the impression is because it was not thought to be significant as it is just an incidental signal that does not correlate with an abnormality otherwise.  Basically he says this is nothing to worry about.    Otherwise his brain looks generally very similar to the 2010 images and no acute concerning findings. We will discuss my over read features that are thought to be nonspecific by radiology at next visit as well      "

## 2024-03-15 NOTE — TELEPHONE ENCOUNTER
Called and advised of the below. He verbalized understanding. Requesting message below be sent to his mychart. Sent

## 2024-06-19 ENCOUNTER — TELEPHONE (OUTPATIENT)
Dept: NEUROLOGY | Facility: CLINIC | Age: 24
End: 2024-06-19

## 2024-06-19 NOTE — TELEPHONE ENCOUNTER
Called patient and left voicemail to confirm their upcoming appointment with Dr. Donovan. Informed patient about arriving in the [place] location 15 minutes prior to appointment to get checked in and go over chart.

## 2024-06-25 ENCOUNTER — OFFICE VISIT (OUTPATIENT)
Dept: NEUROLOGY | Facility: CLINIC | Age: 24
End: 2024-06-25
Payer: COMMERCIAL

## 2024-06-25 VITALS
SYSTOLIC BLOOD PRESSURE: 142 MMHG | HEIGHT: 69 IN | TEMPERATURE: 98.5 F | BODY MASS INDEX: 29.18 KG/M2 | WEIGHT: 197 LBS | DIASTOLIC BLOOD PRESSURE: 91 MMHG | HEART RATE: 76 BPM

## 2024-06-25 DIAGNOSIS — G43.009 MIGRAINE WITHOUT AURA AND WITHOUT STATUS MIGRAINOSUS, NOT INTRACTABLE: Primary | ICD-10-CM

## 2024-06-25 PROCEDURE — 99215 OFFICE O/P EST HI 40 MIN: CPT | Performed by: PSYCHIATRY & NEUROLOGY

## 2024-06-25 NOTE — PROGRESS NOTES
Review of Systems   Constitutional:  Negative for appetite change and fever.   HENT: Negative.  Negative for hearing loss, tinnitus, trouble swallowing and voice change.         Phonophobia   Eyes: Negative.  Negative for photophobia and pain.   Respiratory: Negative.  Negative for shortness of breath.    Cardiovascular: Negative.  Negative for palpitations.   Gastrointestinal: Negative.  Negative for nausea and vomiting.   Endocrine: Negative.  Negative for cold intolerance.   Genitourinary: Negative.  Negative for dysuria, frequency and urgency.   Musculoskeletal: Negative.  Negative for myalgias and neck pain.   Skin: Negative.  Negative for rash.   Neurological:  Positive for headaches. Negative for dizziness, tremors, seizures, syncope, facial asymmetry, speech difficulty, weakness, light-headedness and numbness.   Hematological: Negative.  Does not bruise/bleed easily.   Psychiatric/Behavioral: Negative.  Negative for confusion, hallucinations and sleep disturbance.    All other systems reviewed and are negative.

## 2024-06-25 NOTE — PROGRESS NOTES
[facility] Consult - Follow up   PATIENT:  Winston Grimm  MRN:  [id number]  :  2000  DATE OF SERVICE:  2024  REFERRED BY: No ref. provider found  PMD: Neena Romeo DO    Assessment/Plan:   Winston Grimm is a delightful 23 y.o. male with a past medical history that includes elevated LFTs (now normal), liver steatosis, chronic back pain, Pars defect (ortho and PT), hives, COVID early 2021, migraine, mononucleosis 2021, palpitations (cardiology testing 2023), testicular pain, rash related to sulfa 3/25/2022 referred here for evaluation of headache.  My initial evaluation 2021     Migraine without aura and without status migrainosus, not intractable  H/O concussion  Apneas not meeting criteria for CASSI (home study, 2024, 14 events, 9 obstructive apneas, 5 hypopneas, MEREDITH 2.0, oxygen down to 93%, 2 awakenings during the test and likely underestimated) -he is not interested in in lab study at this time due to possible false negative  He has a history of migraines in his youth and a family history of migraines.  He was seen originally 2021 following possible concussion and persistent posttraumatic headaches and we discussed at the time his history of recent head trauma.  He had history of migraine prior which is what he returns for continued care of on subsequent follow-up. We discussed incident 2020 and 2021 do not sound like concussion since he did not have typical acute concussion symptoms within 24 hours.  3/30/2021 possible but not probable concussion.  Throughout this time he is also dealing with migraine symptoms of COVID from 2021 and diagnosed with mono in 2021.    -   At baseline normally headaches about once a week, more significant migraines when younger.  - as of 2021:  Gradually improving Posttraumatic headache with migrainous features vs headaches related to mono with about 2-3 headaches per  week lasting 30-60 mins about 2-3/10 in severity that self resolve.  We discussed symptoms should continue to improve and return if they do not.  He never returned for follow-up  - as of 2/12/2024: He returns nearly 3 years later reporting he continues to have chronic headaches and migraines as he did even prior to his concussions and we discussed this is likely due to a personal and family history of headaches and migraines.  He also has had 2 hits to the head that may or may not have been concussion, but discussed regardless and these included during a pickup possible game 4/2023, elbowed on the head without acute symptoms, and the following days felt sort of out of it, after 1 drink was dizzy at a friend's apartment and symptoms lasted maybe a week or so before return to normalcy.  That summer while working full-time and on a computer all day he had a bit more headaches maybe once a week which we discussed was his pre-existing baseline from past history.  In November 2023 while drinking he stood up quickly fell back hitting his neck/occipital region on a table and had typical acute concussion symptoms of emotional lability, headache the next day, fell out of it the next couple days and the following week had more headaches.  Headaches were every other day in December 2023 until early January he had a migraine cocktail of sorts with ketorolac/Toradol and diphenhydramine/Benadryl that helped break the migraine cycle.  He reports he is back to headaches 1-2 times a week and we discussed MRI brain to further evaluate for features of increased intracranial pressure.  Sleep study to look for sleep disorder contributing to prolonged symptoms after concussion which is a frequent factor I am finding in my patients and he does have some symptoms of such as well as a possible family history of such.  He is not currently interested in prescription preventative and since OTC meds do not always work we discussed trial of  rizatriptan for migraine rescue.  - as of 6/25/2024: He reports improvement with 1-2 headaches a week and decrease in severity and length of duration of headaches and migraines.  Ibuprofen often helps send he took rizatriptan recently after a 3-day migraine and it worked as well and we discussed he could take this more often as needed. MRI brain as well as over read reviewed together. Sleep study at home reviewed which showed 14 apneas and an average of 2/h and we discussed at any time he would be interested I would be happy to order in lab study to further evaluate for possible false negative as home study can underestimate the problem.    Workup:  -MRI brain with without contrast 2/27/2024: No acute infarction, edema, or pathologic intra-axial enhancement.*As of my retrospective review 6/25/2024 we discussed there are some features thought to be nonspecific by radiology that I am commenting on including partially empty sella/dip in sella, tight ventricles    Preventative:  - we discussed headache hygiene and lifestyle factors that may improve headaches  -He is not currently interested in prescription preventative, headache preventative supplements listed  - Past/ failed/contraindicated: none   - future options: topiramate, verapamil, CGRP     Abortive:  - discussed not taking over-the-counter or prescription pain medications more than 3 days per week to prevent medication overuse/rebound headache  -Currently on through other providers or over-the-counter: Ibuprofen often helps  -  rizatriptan (MAXALT) 10 mg tablet; Take 1 tablet (10 mg total) by mouth once as needed for migraine May repeat in 2 hours if needed. Max 2/24 hours, 9/month. Discussed proper use, possible side effects and risks.  - Past/ failed/contraindicated: OTC meds  - future options: Alternative triptan,  prochlorperazine, Toradol IM or p.o., could consider trial for 5 days of Depakote or dexamethasone 4 prolonged migraine, ubrelvy, reyvow,  "nurtec      We have discussed concussions and the natural course of recovery. We have discussed that symptoms from a concussion typically take 2 weeks to resolve, and although sometimes it can feel like concussion symptoms linger on, at this point these symptoms would be related to contributing factors.    - Contributing factors may include:    posttraumatic headache,   preexisting migraines,  Family history of migraines, anxiety or depression, stress, deconditioning,  comorbid medical diagnoses, young age.  - I have recommended gradual return of normal cognitive and physical activity with safety precautions  - We discussed that newer research regarding concussion shows that the sooner one returns gradually to their normal physical and cognitive routine, the sooner one tends to recover. Prolonged removal from normal routine and deconditioning have been shown to prolong symptoms  - We discussed that sometimes this constellation of symptoms is referred to as \"post concussion syndrome,\" but I prefer not to use this term since that can be misleading and make people think they are still brain injured or \"concussed,\" when the most common and likely etiology this far out from the head trauma is either contributing factors or a form of functional neurologic disorder with mixed symptoms  - We discussed how cognitive issues can have multiple causes and often related to multifactorial etiologies including stress, anxiety,  mood, pain, hypervigilance  and sleep issues and provided reassurance that, it is not likely the cognitive dysfunction is related to concussion at this point.   - Safe driving precautions, should not drive at all if feeling sleepy or cognitively not well.            Patient instructions     As we discussed I tend to find that the blood pressure cuff used in my office overestimates blood pressure but absolutely would discuss blood pressure with PCP, as well as take blood pressure at times at home when not in " the office to get a better idea of baseline and certainly if blood pressure remains elevated would discuss options with PCP for treatment and certainly if ever considering blood pressure medication would consider verapamil which can help with migraine prevention as well    Headache/migraine treatment:   Abortive medications (for immediate treatment of a headache):   It is ok to take ibuprofen, acetaminophen or naproxen (Advil, Tylenol,  Aleve, Excedrin) if they help your headaches you should limit these to No more than 3 times a week to avoid medication overuse/rebound headaches.     You can take this on its own or in combination with an NSAID  For your more moderate to severe migraines take this medication early   Maxalt (rizatriptan) 10mg tabs - take one at the onset of headache. May repeat one time after 2 hours if pain has not resolved.   (Max 2 a day and 9 a month)     Over the counter preventive supplements for headaches/migraines (if you try, try for 3 months straight)  (to take every day to help prevent headaches - not to take at the time of headache):  There are combo pills online of these - none of which regulated by FDA and double check dosing - take appropriate dose only once a day- preventa migraine, migravent, mind ease, migrelief   [] Magnesium 400mg daily (If any diarrhea or upset stomach, decrease dose  as tolerated)  [] Riboflavin (Vitamin B2) 400mg daily - try online   (FYI B2 may make your urine bright/neon yellow)  AND/OR  [] Herbal medication: Petasites/Butterbur 150 mg daily - try online  (When choosing your Butterbur online or in the store, beware that there are some poor preps containing pyrrolizidine alkaloids (PAs) that can be harmful to the liver. Therefore, do not use butterbur products that are not labeled as PA-free.)    Prescription preventive medications for headaches/migraines   (to take every day to help prevent headaches - not to take at the time of headache):  [x] We have options  if needed      Lifestyle Recommendations:  [x] SLEEP - Maintain a regular sleep schedule: Adults need at least 7-8 hours of uninterrupted a night. Maintain good sleep hygiene:  Going to bed and waking up at consistent times, avoiding excessive daytime naps, avoiding caffeinated beverages in the evening, avoid excessive stimulation in the evening and generally using bed primarily for sleeping.  One hour before bedtime would recommend turning lights down lower, decreasing your activity (may read quietly, listen to music at a low volume). When you get into bed, should eliminate all technology (no texting, emailing, playing with your phone, iPad or tablet in bed).  [x] HYDRATION - Maintain good hydration.  Drink  2L of fluid a day (4 typical small water bottles)  [x] DIET - Maintain good nutrition. In particular don't skip meals and try and eat healthy balanced meals regularly.  [x] TRIGGERS - Look for other triggers and avoid them: Limit caffeine to 1-2 cups a day or less. Avoid dietary triggers that you have noticed bring on your headaches (this could include aged cheese, peanuts, MSG, aspartame and nitrates).  [x] EXERCISE - physical exercise as we all know is good for you in many ways, and not only is good for your heart, but also is beneficial for your mental health, cognitive health and  chronic pain/headaches. I would encourage at the least 5 days of physical exercise weekly for at least 30 minutes.     Education and Follow-up  [x] Please call with any questions or concerns. Of course if any new concerning symptoms go to the emergency department.  [x] Follow up, 3-4 months, sooner if needed  If you are doing very well at that time with no concerns or questions you can absolutely push back to 6 months or 1 year    CC:   We had the pleasure of evaluating Winston Grimm in neurological consultation today. Winston Grimm is a   right handed male who presents today for evaluation of headaches.     History  obtained from patient as well as available medical record review.  History of Present Illness:   Interval history as of 6/25/2024  - no significant new or concerning neurologic symptoms since last visit   -MRI brain with without contrast 2/27/2024: No acute infarction, edema, or pathologic intra-axial enhancement.*As of my retrospective review 6/25/2024 we discussed there are some features thought to be nonspecific by radiology that I am commenting on including partially empty sella/dip in sella, tight ventricles    Headaches and migraines   1-2 a week, severity and length of duration decreased   Can wake with neck pain and headache when doesn't sleep well   No n/V, +p/p    Preventative:   -    Abortive:   - ibuprofen often helps  - rizatriptan helps when he took it once last weekend - after 3 day headache - didn't help right away after 30 mins kicked in and a little headache the next day that then broke - no SE  -  Excedrin does not  Denies bothersome side effects      Home study  2/29/24   The test results are from [facility].  The total time in bed (analysis time) was 424.4 minutes.  The patient had a total of 14 respiratory events made up of 9 obstructive apneas, 0 central apneas, 0 mixed apneas and 5 hypopneas resulting in a respiratory event index (MEREDITH) of 2.0.  The lowest SpO2 recorded is 93%.  Notes- the patient estimated 6 hours of sleep with 2 awakenings during this test       Interval history as of 2/12/2024  - he returns nearly 3 years later reporting he has had 2 hits to the head with possible concussion since he was last year.  4/2023 and 11/2023.  He of course has been having headaches for other reasons as well on chart review I see that he had headaches in ER 3/25/2022 when he was dealing with rash.  Followed up with urgent care 1/4/2024 for intractable headache.  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 [place] early June   - April 2023  basketball and driving for lay up and turned body and  caught elbow to the the temple and took a second to process, stepped back and sat down and then stopped playing and then went inside no symptoms otherwise that day, felt fine that day, in the following days felt sort of out of it and a couple of days later got dizzy at a friends apartment, one drink only that night, took a moment outside and went home - symptoms lasted maybe 1 week or so felt normal   - that summer started working full time and on computer all day and had a bit more headaches maybe once a week   - Nov 2023, out one night with friends, went from [place] citizen style to standing and actually fell back hitting the mid neck portion substantially on the corner edge of a table, more emotional after was tearful out of worry with gf which is atypical for his mood, does not recall if he had a headache the next day, felt out of it the next couple of days and the following week after that more headaches - more often, woke up and went to bed with them, not responding to advil like they used to - then every other day for a while In dec, then in January 3 day headache and went to urgent care and was given Toradol and Benadryl and that helped break migraine cycle.  - last eye exam summer 2023, wears glasses and they changed his prescription which increased a little bit and there is a new eye doctor rushing him and the new prescription did not feel right, things far were better on old pair, still wearing lenses now     Headaches and migraines   For a while he was getting headaches every other day, in January went to urgent care and was given Toradol and Benadryl and that helped break migraine cycle.  He was headache free for a week but since they have come back to 1-2 times a week    - vary, occipital region bilaterally - tight and pressure,   Sometimes bitemporal and top of head/apex  Sometimes just unilateral retro-orbital either left or right - equally   Without aura  No N/V  +p/p    Over a day now can last up to 3      Preventative:   - In dec was taking migrelief and didn't help enough over one month and then stopped since pricey     Abortive:   - exedrin works less recently or advil better than exedrin but not always   Denies bothersome side effects     Sleep  7-9 hours, less on weekends, tried to prioritize sleep the past year, prior was all over the place  Sometimes problems falling asleep, nothing major, maybe an hour, often related to caffeine  Wakes up once a night to pee or maybe due to apnea   Side sleeper, sometimes wakes up on back   Snores worse when drinking  Dad has sleep apnea likely   Class III Mallampati score  Sleep is not always restful, naps during day sometimes      History as of initial visit 5/26/2021  - does NOT sound like a classic concussion by current definition    9/19/20 he fell off his skateboard and hit his chin and palm on a gravel road   acute symptoms included: No LOC, no amnesia, felt fine. no dx of concussion  ED - stitches - 2 days later - woke up and felt a little off, a little trouble concentrating, no significant headache   -  NOT a concussion    2/26/2021 hit his head on a cabinet bar when leaning over and coming up hit back of head   acute symptoms included:  No loc, no amnesia. Head trauma was very minor any did not think anything of it because there is no symptoms after the injury.  The following day his head hurt in the region waxing and waning.    -NOT a concussion  3/3/21 - He went to the emergency room due to persistent coughing since diagnosed with COVID -not a concussion - referred to neuro  -  04/05/2021 ED due to lingering COVID issues to rule out bronchitis or other issues - denied any headache, dizziness, nausea, vomiting, weakness.   - possible but not probable concussion on 3/30/21 -  04/08/2021 urgent care then ED where he reported on 03/30/2021 he was jumping up and hit the top of his head on a 4 x 4 piece of wood on ceiling.  No LOC,  No amnesia, no nausea, no  vomiting, felt off, following days trouble concentrating. 4/8/21 reported to ED feeling tired, pupils both dilated, subjective incoordination with normal exam,  Headache, tinnitus, dizziness - turns out he had mono  -  ED 04/14/2021 for nose bleed    Subsequently has been following up with Pediatrics and PT,  See EMR    Headaches started at what age? Around 12 years old  How often do the headaches occur?   - migraines when younger  - normally headaches like once a week   - as of 5/26/2021: 2-3 headaches per week lasting 30-60 mins about 2-3/10  What time of the day do the headaches start?  No particular time of day   How long do the headaches last? Resolve in 30-60 mins, not anymore, over a day, 3 days   Are you ever headache free? Yes - MRI Brain 2/2010    Aura? without aura     Last eye exam: 5/2021 - wears glasses    Where is your headache located and pain quality?   - top middle of head, dull throbbing     What is the intensity of pain? Average: 2-3/10, worst 6/10 - doesn't get these anymore  Associated symptoms:   [] Nausea       [] Vomiting        [x] Problems with concentration - in class for over an hour will be difficult to listen at times, may have a headache at time  [x] Photophobia - a little     [x]Phonophobia      [] Osmophobia  [] Blurred vision   [] Tinnitus - random - 1 minute   [] Hands or feet tingle or feel numb/paresthesias     [x]Sometimes tingling towards back of neck     [] Ptosis      [] Facial droop  [] Lacrimation  [] Nasal congestion/rhinorrhea        Things that make the headache worse? No specific movements    Headache triggers:  Unknown      Have you seen someone else for headaches or pain? Yes, when younger   Have you had trigger point injection performed and how often? No  Have you had Botox injection performed and how often? No   Have you had epidural injections or transforaminal injections performed? No  Have you ever had any Brain imaging? Maybe when little, as a young kid he  thinks so     What medications do you take or have you taken for your headaches?   ABORTIVE:    Nothing lately     PREVENTIVE:   -       Past/ failed/contraindicated:  -     Alternative therapies used in the past for headaches? PT    LIFESTYLE  Sleep   - averages: 7 hours   Problems falling asleep?:   No  Problems staying asleep?:  No    Physical activity:   -  bball, running, biking    Water: 3-4, 32 oz bottles per day  Caffeine: coffee - 2 cups-  per day    Mood: good  Stressed in the beginning   Denies history of anxiety or depression or other diagnosed mood disorder    The following portions of the patient's history were reviewed and updated as appropriate: allergies, current medications, past family history, past medical history, past social history, past surgical history and problem list.    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 - going into sophomore year - good student   Lives with my parents     Illicit Drugs: denies  Alcohol/tobacco: Denies alcohol use, Denies tobacco use    Pertinent lab results:  See EMR for recent labs  - 8/23/2023 CMP and CBC unremarkable  TSH normal   05/21/2021 CMP unremarkable    05/05/2021 CBC unremarkable, elevated AST and ALT (mono)   04/14/2021 CBC unremarkable      Imaging:   I have personally reviewed imaging and radiology read   -MRI brain with without contrast 2/27/2024: No acute infarction, edema, or pathologic intra-axial enhancement.*As of my retrospective review 6/25/2024 we discussed there are some features thought to be nonspecific by radiology that I am commenting on including partially empty sella/dip in sella, tight ventricles   MRI Brain images from 2/23/10 -at [facility] -images uploaded now    Past Medical  History:     Past Medical History:   Diagnosis Date    Concussion     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        Patient Active Problem List   Diagnosis    Pars defect    Lumbar back pain    Infectious mononucleosis    Elevated liver function tests    Pain of upper abdomen    Urinary frequency    Palpitations    Strep throat    Tingling in extremities    Steatosis, liver    Migraine    CASSI (obstructive sleep apnea)    Other sleep disorders       Medications:      Current Outpatient Medications   Medication Sig Dispense Refill    rizatriptan (MAXALT) 10 mg tablet Take 1 tablet (10 mg total) by mouth once as needed for migraine May repeat in 2 hours if needed. Max 2/24 hours, 9/month. 9 tablet 12    multivitamin (THERAGRAN) TABS Take 1 tablet by mouth daily (Patient not taking: Reported on 6/25/2024)       No current facility-administered medications for this visit.        Allergies:      Allergies   Allergen Reactions    Clindamycin Hives    Amoxicillin GI Intolerance    Doxycycline Hives       Family History:     Family History   Problem Relation Age of Onset    Autoimmune disease Mother     Hypertension Father        Social History:     Social History     Socioeconomic History    Marital status: Single     Spouse name: Not on file    Number of children: Not on file    Years of education: Not on file    Highest education level: Not on file   Occupational History    Not on file   Tobacco Use    Smoking status: Never    Smokeless tobacco: Current     Types: Chew   Vaping Use    Vaping status: Never Used   Substance and Sexual Activity    Alcohol use: Yes    Drug use: No    Sexual activity: Not Currently   Other Topics Concern    Not on file   Social History Narrative    Not on file     Social Determinants of Health     Financial Resource Strain: Not on file   Food Insecurity: Not on file   Transportation Needs: Not on file   Physical Activity: Not on file   Stress: Not on file   Social Connections: Not on file   Intimate Partner Violence:  "Not on file   Housing Stability: Not on file         Objective:           Physical Exam:                                                                 Vitals:            Constitutional:    /91 (BP Location: Right arm, Patient Position: Sitting, Cuff Size: Standard)   Pulse 76   Temp 98.5 °F (36.9 °C) (Temporal)   Ht 5' 9\" (1.753 m)   Wt 89.4 kg (197 lb)   BMI 29.09 kg/m²   BP Readings from Last 3 Encounters:   06/25/24 142/91   02/12/24 158/64   01/04/24 135/83     Pulse Readings from Last 3 Encounters:   06/25/24 76   02/12/24 91   01/04/24 (!) 110         Well developed, well nourished, well groomed.        Psychiatric:  Normal behavior and appropriate affect        Neurological Examination:     Mental status/cognitive function:   Recent and remote memory appear intact. Attention span and concentration as well as fund of knowledge are appropriate for age. Normal language and spontaneous speech.  Cranial Nerves:   VII-facial expression symmetric  Motor Exam: symmetric bulk throughout. no atrophy, fasciculations or abnormal movements noted.   Coordination:  no apparent dysmetria, ataxia or tremor noted  Gait: steady casual gait        Review of Systems:   ROS obtained by medical assistant and reviewed, but if any symptoms listed below say negative, does not mean patient has not had this symptom since last visit, please see HPI for details of symptoms discussed this visit.  Regarding any abnormal or positive findings in ROS that are not neurologic related, patient instructed to address these issues with PCP or go to the ER if they are severe.    Review of Systems   Constitutional:  Negative for appetite change and fever.   HENT: Negative.  Negative for hearing loss, tinnitus, trouble swallowing and voice change.         Phonophobia   Eyes: Negative.  Negative for photophobia and pain.   Respiratory: Negative.  Negative for shortness of breath.    Cardiovascular: Negative.  Negative for palpitations. "   Gastrointestinal: Negative.  Negative for nausea and vomiting.   Endocrine: Negative.  Negative for cold intolerance.   Genitourinary: Negative.  Negative for dysuria, frequency and urgency.   Musculoskeletal: Negative.  Negative for myalgias and neck pain.   Skin: Negative.  Negative for rash.   Neurological:  Positive for headaches. Negative for dizziness, tremors, seizures, syncope, facial asymmetry, speech difficulty, weakness, light-headedness and numbness.   Hematological: Negative.  Does not bruise/bleed easily.   Psychiatric/Behavioral: Negative.  Negative for confusion, hallucinations and sleep disturbance.    All other systems reviewed and are negative.       I have spent 38 minutes with Patient  today in which greater than 50% of this time was spent in counseling/coordination of care regarding Diagnostic results, Prognosis, Risks and benefits of tx options, Instructions for management, Patient education, Importance of tx compliance, Risk factor reductions, Impressions, Counseling / Coordination of care, Documenting in the medical record, Reviewing / ordering tests, medicine, procedures  , Obtaining or reviewing history  , and Communicating with other healthcare professionals . I also spent 5 minutes non face to face for this patient the same day.       Author:  Maral Donovan MD 6/25/2024 6:34 PM

## 2024-06-25 NOTE — PATIENT INSTRUCTIONS
Headache/migraine treatment:   Abortive medications (for immediate treatment of a headache):   It is ok to take ibuprofen, acetaminophen or naproxen (Advil, Tylenol,  Aleve, Excedrin) if they help your headaches you should limit these to No more than 3 times a week to avoid medication overuse/rebound headaches.     You can take this on its own or in combination with an NSAID  For your more moderate to severe migraines take this medication early   Maxalt (rizatriptan) 10mg tabs - take one at the onset of headache. May repeat one time after 2 hours if pain has not resolved.   (Max 2 a day and 9 a month)     Over the counter preventive supplements for headaches/migraines (if you try, try for 3 months straight)  (to take every day to help prevent headaches - not to take at the time of headache):  There are combo pills online of these - none of which regulated by FDA and double check dosing - take appropriate dose only once a day- preventa migraine, migravent, mind ease, migrelief   [] Magnesium 400mg daily (If any diarrhea or upset stomach, decrease dose  as tolerated)  [] Riboflavin (Vitamin B2) 400mg daily - try online   (FYI B2 may make your urine bright/neon yellow)  AND/OR  [] Herbal medication: Petasites/Butterbur 150 mg daily - try online  (When choosing your Butterbur online or in the store, beware that there are some poor preps containing pyrrolizidine alkaloids (PAs) that can be harmful to the liver. Therefore, do not use butterbur products that are not labeled as PA-free.)    Prescription preventive medications for headaches/migraines   (to take every day to help prevent headaches - not to take at the time of headache):  [x] We have options if needed      Lifestyle Recommendations:  [x] SLEEP - Maintain a regular sleep schedule: Adults need at least 7-8 hours of uninterrupted a night. Maintain good sleep hygiene:  Going to bed and waking up at consistent times, avoiding excessive daytime naps, avoiding  caffeinated beverages in the evening, avoid excessive stimulation in the evening and generally using bed primarily for sleeping.  One hour before bedtime would recommend turning lights down lower, decreasing your activity (may read quietly, listen to music at a low volume). When you get into bed, should eliminate all technology (no texting, emailing, playing with your phone, iPad or tablet in bed).  [x] HYDRATION - Maintain good hydration.  Drink  2L of fluid a day (4 typical small water bottles)  [x] DIET - Maintain good nutrition. In particular don't skip meals and try and eat healthy balanced meals regularly.  [x] TRIGGERS - Look for other triggers and avoid them: Limit caffeine to 1-2 cups a day or less. Avoid dietary triggers that you have noticed bring on your headaches (this could include aged cheese, peanuts, MSG, aspartame and nitrates).  [x] EXERCISE - physical exercise as we all know is good for you in many ways, and not only is good for your heart, but also is beneficial for your mental health, cognitive health and  chronic pain/headaches. I would encourage at the least 5 days of physical exercise weekly for at least 30 minutes.     Education and Follow-up  [x] Please call with any questions or concerns. Of course if any new concerning symptoms go to the emergency department.  [x] Follow up, 3-4 months, sooner if needed  If you are doing very well at that time with no concerns or questions you can absolutely push back to 6 months or 1 year

## 2024-10-10 ENCOUNTER — TELEPHONE (OUTPATIENT)
Dept: NEUROLOGY | Facility: CLINIC | Age: 24
End: 2024-10-10

## 2024-10-10 NOTE — TELEPHONE ENCOUNTER
LMOM ofr pt to see if they can have their VV appt tomorrow 10/11 at 4pm instead of 3:30pm. Gave pt c/b #. Call center please confirm with me if pt can do 4p. I will c/b pt to confirm switch of appt time if they are agreeable.

## 2024-10-10 NOTE — TELEPHONE ENCOUNTER
Patient called back he said that four o'clock is fine with him. Please update as it would not allow me to do so on the schedule. Thanks

## 2024-10-11 ENCOUNTER — TELEMEDICINE (OUTPATIENT)
Dept: NEUROLOGY | Facility: CLINIC | Age: 24
End: 2024-10-11
Payer: COMMERCIAL

## 2024-10-11 ENCOUNTER — TELEPHONE (OUTPATIENT)
Dept: NEUROLOGY | Facility: CLINIC | Age: 24
End: 2024-10-11

## 2024-10-11 DIAGNOSIS — G43.009 MIGRAINE WITHOUT AURA AND WITHOUT STATUS MIGRAINOSUS, NOT INTRACTABLE: Primary | ICD-10-CM

## 2024-10-11 DIAGNOSIS — M54.2 CERVICALGIA: ICD-10-CM

## 2024-10-11 PROBLEM — G47.33 OSA (OBSTRUCTIVE SLEEP APNEA): Status: RESOLVED | Noted: 2024-03-04 | Resolved: 2024-10-11

## 2024-10-11 PROCEDURE — 99214 OFFICE O/P EST MOD 30 MIN: CPT | Performed by: PSYCHIATRY & NEUROLOGY

## 2024-10-11 NOTE — TELEPHONE ENCOUNTER
----- Message from Maral Donovan MD sent at 10/11/2024  4:49 PM EDT -----  Could you please help him find an eye doctor covered by his insurance, I do not need any specific type of an exam he is just looking for a new optometrist, thank you!

## 2024-10-11 NOTE — PROGRESS NOTES
Review of Systems   Constitutional:  Negative for appetite change, fatigue and fever.   HENT: Negative.  Negative for hearing loss, tinnitus, trouble swallowing and voice change.    Eyes: Negative.  Negative for photophobia, pain and visual disturbance.   Respiratory: Negative.  Negative for shortness of breath.    Cardiovascular: Negative.  Negative for palpitations.   Gastrointestinal: Negative.  Negative for nausea and vomiting.   Endocrine: Negative.  Negative for cold intolerance.   Genitourinary: Negative.  Negative for dysuria, frequency and urgency.   Musculoskeletal:  Positive for neck pain (onset). Negative for back pain, gait problem, myalgias and neck stiffness.   Skin: Negative.  Negative for rash.   Allergic/Immunologic: Negative.    Neurological:  Positive for headaches (1-2 wkly same intensity). Negative for dizziness, tremors, seizures, syncope, facial asymmetry, speech difficulty, weakness, light-headedness and numbness.   Hematological: Negative.  Does not bruise/bleed easily.   Psychiatric/Behavioral: Negative.  Negative for confusion, hallucinations and sleep disturbance.    All other systems reviewed and are negative.

## 2024-10-11 NOTE — PROGRESS NOTES
Virtual Regular Visit  Name: Winston Grimm      : 2000      MRN: [id number]  Encounter Provider: Maral Donovan MD  Encounter Date: 10/11/2024   Encounter department: NEUROLOGY [facility]    Verification of patient location:    Patient is located at Home in the following state in which I hold an active license [place]    Assessment & Plan  Migraine without aura and without status migrainosus, not intractable    Orders:    Ambulatory Referral to Physical Therapy; Future    Cervicalgia    Orders:    Ambulatory Referral to Physical Therapy; Future        Encounter provider Maral Donovan MD    The patient was identified by name and date of birth. Winston Grimm was informed that this is a telemedicine visit and that the visit is being conducted through the Epic Embedded platform. He agrees to proceed..  My office door was closed. No one else was in the room.  He acknowledged consent and understanding of privacy and security of the video platform. The patient has agreed to participate and understands they can discontinue the visit at any time.    Patient is aware this is a billable service.       Assessment/Plan:   Winston Grimm is a delightful 24 y.o. male with a past medical history that includes elevated LFTs (now normal), liver steatosis, chronic back pain, Pars defect (ortho and PT), hives, COVID early 2021, migraine, mononucleosis 2021, palpitations (cardiology testing 2023), testicular pain, rash related to sulfa 3/25/2022 referred here for evaluation of headache.  My initial evaluation 2021     Migraine without aura and without status migrainosus, not intractable  H/O concussion  Apneas not meeting criteria for CSASI (home study, 2024, 14 events, 9 obstructive apneas, 5 hypopneas, MEREDITH 2.0, oxygen down to 93%, 2 awakenings during the test and likely underestimated) -he is not interested in in lab study at this time due to possible false negative  He has a  history of migraines in his youth and a family history of migraines.  He was seen originally 5/26/2021 following possible concussion and persistent posttraumatic headaches and we discussed at the time his history of recent head trauma.  He had history of migraine prior which is what he returns for continued care of on subsequent follow-up. We discussed incident 09/19/2020 and 02/26/2021 do not sound like concussion since he did not have typical acute concussion symptoms within 24 hours.  3/30/2021 possible but not probable concussion.  Throughout this time he is also dealing with migraine symptoms of COVID from February 2021 and diagnosed with mono in April 2021.    -   At baseline normally headaches about once a week, more significant migraines when younger.  - as of 5/26/2021:  Gradually improving Posttraumatic headache with migrainous features vs headaches related to mono with about 2-3 headaches per week lasting 30-60 mins about 2-3/10 in severity that self resolve.  We discussed symptoms should continue to improve and return if they do not.  He never returned for follow-up  - as of 2/12/2024: He returns nearly 3 years later reporting he continues to have chronic headaches and migraines as he did even prior to his concussions and we discussed this is likely due to a personal and family history of headaches and migraines.  He also has had 2 hits to the head that may or may not have been concussion, but discussed regardless and these included during a pickup possible game 4/2023, elbowed on the head without acute symptoms, and the following days felt sort of out of it, after 1 drink was dizzy at a friend's apartment and symptoms lasted maybe a week or so before return to normalcy.  That summer while working full-time and on a computer all day he had a bit more headaches maybe once a week which we discussed was his pre-existing baseline from past history.  In November 2023 while drinking he stood up quickly fell  back hitting his neck/occipital region on a table and had typical acute concussion symptoms of emotional lability, headache the next day, fell out of it the next couple days and the following week had more headaches.  Headaches were every other day in December 2023 until early January he had a migraine cocktail of sorts with ketorolac/Toradol and diphenhydramine/Benadryl that helped break the migraine cycle.  He reports he is back to headaches 1-2 times a week and we discussed MRI brain to further evaluate for features of increased intracranial pressure.  Sleep study to look for sleep disorder contributing to prolonged symptoms after concussion which is a frequent factor I am finding in my patients and he does have some symptoms of such as well as a possible family history of such.  He is not currently interested in prescription preventative and since OTC meds do not always work we discussed trial of rizatriptan for migraine rescue.  - as of 6/25/2024: He reports improvement with 1-2 headaches a week and decrease in severity and length of duration of headaches and migraines.  Ibuprofen often helps and he took rizatriptan recently after a 3-day migraine and it worked as well and we discussed he could take this more often as needed. MRI brain as well as over read reviewed together. Sleep study at home reviewed which showed 14 apneas and an average of 2/h and we discussed at any time he would be interested I would be happy to order in lab study to further evaluate for possible false negative as home study can underestimate the problem.  - as of 10/11/2024: He reports he is doing a little bit better with 0-2 headaches a week and typically ibuprofen helps, a few have been more migrainous and he has taken rizatriptan over 9 times in over 3 months and denies bothersome side effects although may make him a little sleepy, but nothing where he cannot function and we discussed if ever needed we have many other options.  He is  now working in commercial real estate and finds the days that he sleeps and he will wake up with a headache we discussed possible etiologies and recommended consistent wake up times to help with circadian rhythm.  Overdue for eye exam and will have our  see if she can help him find a new doctor.  Going to be seeing orthopedics soon for knee pain which has kept him from exercising/running which was really also helping with headache prevention and hopefully he can get back to this soon.  He reports some neck tension and is open to physical therapy which I placed referral for.    Workup:  -MRI brain with without contrast 2/27/2024: No acute infarction, edema, or pathologic intra-axial enhancement.*As of my retrospective review 6/25/2024 we discussed there are some features thought to be nonspecific by radiology that I am commenting on including partially empty sella/dip in sella, tight ventricles    Preventative:  - we discussed headache hygiene and lifestyle factors that may improve headaches  -He is not currently interested in prescription preventative, headache preventative supplements listed  - Past/ failed/contraindicated: none   - future options: topiramate, verapamil, CGRP     Abortive:  - discussed not taking over-the-counter or prescription pain medications more than 3 days per week to prevent medication overuse/rebound headache  - Currently on through other providers or over-the-counter: Ibuprofen often helps  -  rizatriptan (MAXALT) 10 mg tablet; Take 1 tablet (10 mg total) by mouth once as needed for migraine May repeat in 2 hours if needed. Max 2/24 hours, 9/month. Discussed proper use, possible side effects and risks.  - Past/ failed/contraindicated: OTC meds  - future options: Alternative triptan,  prochlorperazine, Toradol IM or p.o., could consider trial for 5 days of Depakote or dexamethasone 4 prolonged migraine, ubrelvy, reyvow, nurtec      We have discussed concussions and the natural  "course of recovery. We have discussed that symptoms from a concussion typically take 2 weeks to resolve, and although sometimes it can feel like concussion symptoms linger on, at this point these symptoms would be related to contributing factors.    - Contributing factors may include:    posttraumatic headache,   preexisting migraines,  Family history of migraines, anxiety or depression, stress, deconditioning,  comorbid medical diagnoses, young age.  - I have recommended gradual return of normal cognitive and physical activity with safety precautions  - We discussed that newer research regarding concussion shows that the sooner one returns gradually to their normal physical and cognitive routine, the sooner one tends to recover. Prolonged removal from normal routine and deconditioning have been shown to prolong symptoms  - We discussed that sometimes this constellation of symptoms is referred to as \"post concussion syndrome,\" but I prefer not to use this term since that can be misleading and make people think they are still brain injured or \"concussed,\" when the most common and likely etiology this far out from the head trauma is either contributing factors or a form of functional neurologic disorder with mixed symptoms  - We discussed how cognitive issues can have multiple causes and often related to multifactorial etiologies including stress, anxiety,  mood, pain, hypervigilance  and sleep issues and provided reassurance that, it is not likely the cognitive dysfunction is related to concussion at this point.   - Safe driving precautions, should not drive at all if feeling sleepy or cognitively not well.      Cervicalgia  -   We discussed not as much my of expertise but physical therapy usually is the treatment and if ever needed could always replace referral to physiatry orthopedics or pain management if wanted.   -  Ambulatory Referral to Physical Therapy; Future      Patient instructions     As we discussed I " tend to find that the blood pressure cuff used in my office overestimates blood pressure but absolutely would discuss blood pressure with PCP, as well as take blood pressure at times at home when not in the office to get a better idea of baseline and certainly if blood pressure remains elevated would discuss options with PCP for treatment and certainly if ever considering blood pressure medication would consider verapamil which can help with migraine prevention as well    Headache/migraine treatment:   Abortive medications (for immediate treatment of a headache):   It is ok to take ibuprofen, acetaminophen or naproxen (Advil, Tylenol,  Aleve, Excedrin) if they help your headaches you should limit these to No more than 3 times a week to avoid medication overuse/rebound headaches.     You can take this on its own or in combination with an NSAID  For your more moderate to severe migraines take this medication early   Maxalt (rizatriptan) 10mg tabs - take one at the onset of headache. May repeat one time after 2 hours if pain has not resolved.   (Max 2 a day and 9 a month)     Over the counter preventive supplements for headaches/migraines (if you try, try for 3 months straight)  (to take every day to help prevent headaches - not to take at the time of headache):  There are combo pills online of these - none of which regulated by FDA and double check dosing - take appropriate dose only once a day- preventa migraine, migravent, mind ease, migrelief   [] Magnesium 400mg daily (If any diarrhea or upset stomach, decrease dose  as tolerated)  [] Riboflavin (Vitamin B2) 400mg daily - try online   (FYI B2 may make your urine bright/neon yellow)  AND/OR  [] Herbal medication: Petasites/Butterbur 150 mg daily - try online  (When choosing your Butterbur online or in the store, beware that there are some poor preps containing pyrrolizidine alkaloids (PAs) that can be harmful to the liver. Therefore, do not use butterbur products  that are not labeled as PA-free.)    Prescription preventive medications for headaches/migraines   (to take every day to help prevent headaches - not to take at the time of headache):  [x] We have options if needed      Lifestyle Recommendations:  [x] SLEEP - Maintain a regular sleep schedule: Adults need at least 7-8 hours of uninterrupted a night. Maintain good sleep hygiene:  Going to bed and waking up at consistent times, avoiding excessive daytime naps, avoiding caffeinated beverages in the evening, avoid excessive stimulation in the evening and generally using bed primarily for sleeping.  One hour before bedtime would recommend turning lights down lower, decreasing your activity (may read quietly, listen to music at a low volume). When you get into bed, should eliminate all technology (no texting, emailing, playing with your phone, iPad or tablet in bed).  [x] HYDRATION - Maintain good hydration.  Drink  2L of fluid a day (4 typical small water bottles)  [x] DIET - Maintain good nutrition. In particular don't skip meals and try and eat healthy balanced meals regularly.  [x] TRIGGERS - Look for other triggers and avoid them: Limit caffeine to 1-2 cups a day or less. Avoid dietary triggers that you have noticed bring on your headaches (this could include aged cheese, peanuts, MSG, aspartame and nitrates).  [x] EXERCISE - physical exercise as we all know is good for you in many ways, and not only is good for your heart, but also is beneficial for your mental health, cognitive health and  chronic pain/headaches. I would encourage at the least 5 days of physical exercise weekly for at least 30 minutes.     Education and Follow-up  [x] Please call with any questions or concerns. Of course if any new concerning symptoms go to the emergency department.  [x] Follow up, 3-6 months, sooner if needed  If you are doing very well at that time with no concerns or questions you can absolutely push back to 6 months or 1  year    CC:   We had the pleasure of evaluating Winston Grimm in neurological consultation today. Winston Grimm is a   right handed male who presents today for evaluation of headaches.     History obtained from patient as well as available medical record review.  History of Present Illness:   Interval history as of 10/11/2024  - no significant new or concerning neurologic symptoms since last visit reported  - last eye exam: - last eye exam summer 2023, wears glasses and they changed his prescription which increased a little bit and there is a new eye doctor rushing him and the new prescription did not feel right, things far were better on old pair, got a new prescription, still wearing lenses now     - sleep:   - since he started working in person Tues through thurs and home Monday and Friday and Fridays will work up with a headache  - will sleep in a little more or go to bed a little later - getting up at the same time would be better he thinks. Even if he struggles to go to sleep, may not be until 12:30 and will wake up tired but not with a headache at 7 and otherwise 8:30 when working from home   - commercial real estate - liking it     - exercising a lot more this summer, last spring leading up to last visit he noticed cardio is helping, has been having some knee problems recently - was finding cardio was helping with headaches a lot of running and biking and therefore he got into running more on a consistent basis, and wasn't stretching and got some sort of overuse injury per school doctor - runner knee they told him and he is about to see orthopedics for this and there have been a couple times where he has woken up in the middle of the night and he felt like he could not walk due to how much pain in his knee R>L     Headaches and migraines   Around the same 1-2 a week and some weeks zero   No n/V, +p/p  Sometimes can wake up with neck pain     Preventative:   -     Abortive:   - ibuprofen often  helps  - rizatriptan helps when needed - a few times, refilled, no SE except tired but can't function, but often in the evening - if he wakes up with a headache will take ibuprofen which helps the best and trying to limit how much he takes in a week   No reported bothersome side effects        Interval history as of 6/25/2024  - no significant new or concerning neurologic symptoms since last visit   -MRI brain with without contrast 2/27/2024: No acute infarction, edema, or pathologic intra-axial enhancement.*As of my retrospective review 6/25/2024 we discussed there are some features thought to be nonspecific by radiology that I am commenting on including partially empty sella/dip in sella, tight ventricles    Headaches and migraines   1-2 a week, severity and length of duration decreased   Can wake with neck pain and headache when doesn't sleep well   No n/V, +p/p    Preventative:   -    Abortive:   - ibuprofen often helps  - rizatriptan helps when he took it once last weekend - after 3 day headache - didn't help right away after 30 mins kicked in and a little headache the next day that then broke - no SE  -  Excedrin does not  Denies bothersome side effects      Home study  2/29/24   The test results are from [facility].  The total time in bed (analysis time) was 424.4 minutes.  The patient had a total of 14 respiratory events made up of 9 obstructive apneas, 0 central apneas, 0 mixed apneas and 5 hypopneas resulting in a respiratory event index (MEREDITH) of 2.0.  The lowest SpO2 recorded is 93%.  Notes- the patient estimated 6 hours of sleep with 2 awakenings during this test       Interval history as of 2/12/2024  - he returns nearly 3 years later reporting he has had 2 hits to the head with possible concussion since he was last year.  4/2023 and 11/2023.  He of course has been having headaches for other reasons as well on chart review I see that he had headaches in ER 3/25/2022 when he was dealing with rash.  Followed  up with urgent care 1/4/2024 for intractable headache.  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   - April 2023  basketball and driving for lay up and turned body and caught elbow to the the temple and took a second to process, stepped back and sat down and then stopped playing and then went inside no symptoms otherwise that day, felt fine that day, in the following days felt sort of out of it and a couple of days later got dizzy at a friends apartment, one drink only that night, took a moment outside and went home - symptoms lasted maybe 1 week or so felt normal   - that summer started working full time and on computer all day and had a bit more headaches maybe once a week   - Nov 2023, out one night with friends, went from  style to standing and actually fell back hitting the mid neck portion substantially on the corner edge of a table, more emotional after was tearful out of worry with gf which is atypical for his mood, does not recall if he had a headache the next day, felt out of it the next couple of days and the following week after that more headaches - more often, woke up and went to bed with them, not responding to advil like they used to - then every other day for a while In dec, then in January 3 day headache and went to urgent care and was given Toradol and Benadryl and that helped break migraine cycle.  - last eye exam summer 2023, wears glasses and they changed his prescription which increased a little bit and there is a new eye doctor rushing him and the new prescription did not feel right, things far were better on old pair, still wearing lenses now     Headaches and migraines   For a while he was getting headaches every other day, in January went to urgent care and was given Toradol and Benadryl and that helped break migraine cycle.  He was headache free for a week but since they have come back to 1-2 times a week    - vary, occipital region bilaterally - tight and  pressure,   Sometimes bitemporal and top of head/apex  Sometimes just unilateral retro-orbital either left or right - equally   Without aura  No N/V  +p/p    Over a day now can last up to 3     Preventative:   - In dec was taking migrelief and didn't help enough over one month and then stopped since pricey     Abortive:   - exedrin works less recently or advil better than exedrin but not always   Denies bothersome side effects     Sleep  7-9 hours, less on weekends, tried to prioritize sleep the past year, prior was all over the place  Sometimes problems falling asleep, nothing major, maybe an hour, often related to caffeine  Wakes up once a night to pee or maybe due to apnea   Side sleeper, sometimes wakes up on back   Snores worse when drinking  Dad has sleep apnea likely   Class III Mallampati score  Sleep is not always restful, naps during day sometimes      History as of initial visit 5/26/2021  - does NOT sound like a classic concussion by current definition    9/19/20 he fell off his skateboard and hit his chin and palm on a gravel road   acute symptoms included: No LOC, no amnesia, felt fine. no dx of concussion  ED - stitches - 2 days later - woke up and felt a little off, a little trouble concentrating, no significant headache   -  NOT a concussion    2/26/2021 hit his head on a cabinet bar when leaning over and coming up hit back of head   acute symptoms included:  No loc, no amnesia. Head trauma was very minor any did not think anything of it because there is no symptoms after the injury.  The following day his head hurt in the region waxing and waning.    -NOT a concussion  3/3/21 - He went to the emergency room due to persistent coughing since diagnosed with COVID -not a concussion - referred to neuro  -  04/05/2021 ED due to lingering COVID issues to rule out bronchitis or other issues - denied any headache, dizziness, nausea, vomiting, weakness.   - possible but not probable concussion on 3/30/21 -   04/08/2021 urgent care then ED where he reported on 03/30/2021 he was jumping up and hit the top of his head on a 4 x 4 piece of wood on ceiling.  No LOC,  No amnesia, no nausea, no vomiting, felt off, following days trouble concentrating. 4/8/21 reported to ED feeling tired, pupils both dilated, subjective incoordination with normal exam,  Headache, tinnitus, dizziness - turns out he had mono  -  ED 04/14/2021 for nose bleed    Subsequently has been following up with Pediatrics and PT,  See EMR    Headaches started at what age? Around 12 years old  How often do the headaches occur?   - migraines when younger  - normally headaches like once a week   - as of 5/26/2021: 2-3 headaches per week lasting 30-60 mins about 2-3/10  What time of the day do the headaches start?  No particular time of day   How long do the headaches last? Resolve in 30-60 mins, not anymore, over a day, 3 days   Are you ever headache free? Yes - MRI Brain 2/2010    Aura? without aura     Last eye exam: 5/2021 - wears glasses    Where is your headache located and pain quality?   - top middle of head, dull throbbing     What is the intensity of pain? Average: 2-3/10, worst 6/10 - doesn't get these anymore  Associated symptoms:   [] Nausea       [] Vomiting        [x] Problems with concentration - in class for over an hour will be difficult to listen at times, may have a headache at time  [x] Photophobia - a little     [x]Phonophobia      [] Osmophobia  [] Blurred vision   [] Tinnitus - random - 1 minute   [] Hands or feet tingle or feel numb/paresthesias     [x]Sometimes tingling towards back of neck     [] Ptosis      [] Facial droop  [] Lacrimation  [] Nasal congestion/rhinorrhea        Things that make the headache worse? No specific movements    Headache triggers:  Unknown      Have you seen someone else for headaches or pain? Yes, when younger   Have you had trigger point injection performed and how often? No  Have you had Botox injection  performed and how often? No   Have you had epidural injections or transforaminal injections performed? No  Have you ever had any Brain imaging? Maybe when little, as a young kid he thinks so     What medications do you take or have you taken for your headaches?   ABORTIVE:    Nothing lately     PREVENTIVE:   -       Past/ failed/contraindicated:  -     Alternative therapies used in the past for headaches? PT    LIFESTYLE  Sleep   - averages: 7 hours   Problems falling asleep?:   No  Problems staying asleep?:  No    Physical activity:   -  bball, running, biking    Water: 3-4, 32 oz bottles per day  Caffeine: coffee - 2 cups-  per day    Mood: good  Stressed in the beginning   Denies history of anxiety or depression or other diagnosed mood disorder    The following portions of the patient's history were reviewed and updated as appropriate: allergies, current medications, past family history, past medical history, past social history, past surgical history and problem list.    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 - going into sophomore year - good student   Lives with my parents     Illicit Drugs: denies  Alcohol/tobacco: Denies alcohol use, Denies tobacco use    Pertinent lab results:  See EMR for recent labs  - 8/23/2023 CMP and CBC unremarkable  TSH normal   05/21/2021 CMP unremarkable    05/05/2021 CBC unremarkable, elevated AST and ALT (mono)   04/14/2021 CBC unremarkable      Imaging:   I have personally reviewed imaging and radiology read   -MRI brain with without contrast 2/27/2024: No acute infarction, edema, or pathologic intra-axial enhancement.*As of my retrospective review 6/25/2024 we discussed  there are some features thought to be nonspecific by radiology that I am commenting on including partially empty sella/dip in sella, tight ventricles   MRI Brain images from 2/23/10 -at [facility] -images uploaded now        Objective     There were no vitals taken for this visit.    Objective:     Exam limited by Video  Physical Exam:                                                                 Vitals:            Constitutional:    There were no vitals taken for this visit.  BP Readings from Last 3 Encounters:   06/25/24 142/91   02/12/24 158/64   01/04/24 135/83     Pulse Readings from Last 3 Encounters:   06/25/24 76   02/12/24 91   01/04/24 (!) 110         Well developed, well nourished, No dysmorphic features.         Normocephalic atraumatic.     Normal behavior and appropriate affect        Able to answer questions appropriately, provide history of recent events   Normal language and spontaneous speech.  facial expression symmetric  symmetric bulk throughout. no atrophy, fasciculations or significant abnormal movements noted during our visit from observation.        Review of Systems:   ROS obtained by medical assistant and reviewed, but if any symptoms listed below say negative, does not mean patient has not had this symptom since last visit, please see HPI for details of symptoms discussed this visit.  Regarding any abnormal or positive findings in ROS that are not neurologic related, patient instructed to address these issues with PCP or go to the ER if they are severe.    Review of Systems   Constitutional:  Negative for appetite change, fatigue and fever.   HENT: Negative.  Negative for hearing loss, tinnitus, trouble swallowing and voice change.    Eyes: Negative.  Negative for photophobia, pain and visual disturbance.   Respiratory: Negative.  Negative for shortness of breath.    Cardiovascular: Negative.  Negative for palpitations.   Gastrointestinal: Negative.  Negative for nausea and vomiting.    Endocrine: Negative.  Negative for cold intolerance.   Genitourinary: Negative.  Negative for dysuria, frequency and urgency.   Musculoskeletal:  Positive for neck pain  Negative for back pain, gait problem, myalgias and neck stiffness.   Skin: Negative.  Negative for rash.   Allergic/Immunologic: Negative.    Neurological:  Positive for headaches . Negative for dizziness, tremors, seizures, syncope, facial asymmetry, speech difficulty, weakness, light-headedness and numbness.   Hematological: Negative.  Does not bruise/bleed easily.   Psychiatric/Behavioral: Negative.  Negative for confusion, hallucinations and sleep disturbance.    All other systems reviewed and are negative.         I have spent 26 minutes with Patient today in which greater than 50% of this time was spent in counseling/coordination of care regarding Prognosis, Risks and benefits of tx options, Instructions for management, Patient  education, Importance of tx compliance, Risk factor reductions, Impressions, Counseling / Coordination of care, Documenting in the medical record, Reviewing / ordering tests, medicine, procedures  , Obtaining or reviewing history  , and Communicating with other healthcare professionals . I also spent 5 minutes non face to face for this patient the same day.           Visit Time  Total Visit Duration: 31

## 2024-10-14 NOTE — TELEPHONE ENCOUNTER
[employer] message was sent to pt with list of in network optometrist.     MSW remains available       Lauryn YEPEZVika Pereira, OD  [address] [facility] [place] [address]  [phone]    Narayan Rosas, OD  [address] [place] [address]  [phone]    Ralf Abraham, OD  [facility]  [address] [place] [phone]    Franca Butts, OD  [facility] [id number] [place] [phone]    Alyson Amos, OD  [facility] [id number] [place]  [address]  [phone]    Cuco Rust, OD  [address] [place] [phone]    Jarrod Tinajero, OD  [address] [place] [phone]    Syed Singleton, OD  [address] [place] [phone]    Hannah Peraza, OD  [address] [place] [phone]    Jermaine Bunch, OD  [facility]  [address] [place] [phone]    Leah Bowden, OD  [facility]  [address] [place] [phone]    Patrick Gomez, OD  [facility]  [address] [place] [phone]    Salma Ngo MD, OD  [facility]  [address] [place] [phone]    Evangelina Anderson, OD  [facility]  [address] [place] [phone]    Dolores Yoo OD  [facility]  [address] [place] [address] [address] [place], [place] [phone]    Lupe Bullock, OD  [facility] [id number] [facility] [address] [place] [phone]    Eliecer Douglas, OD  [facility] [id number] [place], [place] [phone]    Selin Gomez, OD  [facility] [place] [id number], [place] [phone]    Meño Gomez,  OD  [facility] [place] [id number] [place] [address] [phone]

## 2024-10-15 ENCOUNTER — TELEPHONE (OUTPATIENT)
Dept: NEUROLOGY | Facility: CLINIC | Age: 24
End: 2024-10-15

## 2024-10-15 NOTE — TELEPHONE ENCOUNTER
Called patient to get his 3-6 mon F/U scheduled with dr mishra, patient was unable to answer the phone, I left patient a  with a call back number.

## 2024-10-17 NOTE — TELEPHONE ENCOUNTER
Attempt #2   MSW phoned pt and lvm informing that list of optometrist was sent via [employer].

## 2024-10-18 NOTE — TELEPHONE ENCOUNTER
Attempt #3   MSW phoned pt and lvm informing that list of optometrist was sent via [employer].

## 2025-01-12 NOTE — ASSESSMENT & PLAN NOTE
Overall patient continues to note improvement  Overall discomfort is 2/10  He has been using the back brace  On physical exam he is nontender to palpation over the lumbosacral spine  Negative single limb extension test   He has good range of motion of the lumbar spine is neurologically intact L2-S1  Start physical therapy for core strengthening lower back strengthening and hamstring stretching  Discontinue brace in 2 weeks  Follow-up in 4 weeks for re-evaluation    X-rays will be obtained at that time in the office
12-Jan-2025 20:35

## 2025-02-14 ENCOUNTER — TELEPHONE (OUTPATIENT)
Dept: FAMILY MEDICINE CLINIC | Facility: CLINIC | Age: 25
End: 2025-02-14

## 2025-02-14 NOTE — TELEPHONE ENCOUNTER
1st call    Lvm that pcp left practice and to call to schedule overdue physical with a different provider.

## 2025-05-19 DIAGNOSIS — G43.009 MIGRAINE WITHOUT AURA AND WITHOUT STATUS MIGRAINOSUS, NOT INTRACTABLE: ICD-10-CM

## 2025-05-22 RX ORDER — RIZATRIPTAN BENZOATE 10 MG/1
10 TABLET ORAL ONCE AS NEEDED
Qty: 9 TABLET | Refills: 12 | Status: SHIPPED | OUTPATIENT
Start: 2025-05-22